# Patient Record
Sex: FEMALE | Race: WHITE | Employment: PART TIME | URBAN - METROPOLITAN AREA
[De-identification: names, ages, dates, MRNs, and addresses within clinical notes are randomized per-mention and may not be internally consistent; named-entity substitution may affect disease eponyms.]

---

## 2017-02-23 ENCOUNTER — OFFICE VISIT (OUTPATIENT)
Dept: FAMILY MEDICINE | Facility: CLINIC | Age: 37
End: 2017-02-23

## 2017-02-23 ENCOUNTER — TELEPHONE (OUTPATIENT)
Dept: FAMILY MEDICINE | Facility: CLINIC | Age: 37
End: 2017-02-23

## 2017-02-23 VITALS
OXYGEN SATURATION: 96 % | RESPIRATION RATE: 18 BRPM | DIASTOLIC BLOOD PRESSURE: 79 MMHG | TEMPERATURE: 98 F | HEART RATE: 73 BPM | SYSTOLIC BLOOD PRESSURE: 116 MMHG

## 2017-02-23 DIAGNOSIS — F33.1 MODERATE EPISODE OF RECURRENT MAJOR DEPRESSIVE DISORDER (H): ICD-10-CM

## 2017-02-23 DIAGNOSIS — M54.9 UPPER BACK PAIN, CHRONIC: Primary | ICD-10-CM

## 2017-02-23 DIAGNOSIS — G89.29 UPPER BACK PAIN, CHRONIC: Primary | ICD-10-CM

## 2017-02-23 RX ORDER — BUPROPION HYDROCHLORIDE 150 MG/1
150 TABLET ORAL EVERY MORNING
Qty: 30 TABLET | Refills: 3 | Status: SHIPPED | OUTPATIENT
Start: 2017-02-23 | End: 2017-02-27

## 2017-02-23 RX ORDER — VENLAFAXINE HYDROCHLORIDE 37.5 MG/1
37.5 CAPSULE, EXTENDED RELEASE ORAL DAILY
Qty: 30 CAPSULE | Refills: 3 | Status: SHIPPED | OUTPATIENT
Start: 2017-02-23 | End: 2017-10-17

## 2017-02-23 ASSESSMENT — ANXIETY QUESTIONNAIRES
GAD7 TOTAL SCORE: 7
2. NOT BEING ABLE TO STOP OR CONTROL WORRYING: SEVERAL DAYS
5. BEING SO RESTLESS THAT IT IS HARD TO SIT STILL: NOT AT ALL
1. FEELING NERVOUS, ANXIOUS, OR ON EDGE: MORE THAN HALF THE DAYS
7. FEELING AFRAID AS IF SOMETHING AWFUL MIGHT HAPPEN: NOT AT ALL
6. BECOMING EASILY ANNOYED OR IRRITABLE: NEARLY EVERY DAY
IF YOU CHECKED OFF ANY PROBLEMS ON THIS QUESTIONNAIRE, HOW DIFFICULT HAVE THESE PROBLEMS MADE IT FOR YOU TO DO YOUR WORK, TAKE CARE OF THINGS AT HOME, OR GET ALONG WITH OTHER PEOPLE: SOMEWHAT DIFFICULT
3. WORRYING TOO MUCH ABOUT DIFFERENT THINGS: SEVERAL DAYS

## 2017-02-23 ASSESSMENT — PATIENT HEALTH QUESTIONNAIRE - PHQ9: 5. POOR APPETITE OR OVEREATING: NOT AT ALL

## 2017-02-23 NOTE — PATIENT INSTRUCTIONS
Buproprion 150mg tablet once daily for at least 2 weeks, then stop.   After 1 month off buproprion completely, start Venlafaxine (Effexor) taper:  Venlafaxine 37.5mg tablet once daily for at least 4 weeks, then stop.     Continue therapy weekly  Follow up with Dr. Cheema in 1-2 months  We can do a slower taper with twice daily dosing if this taper is not working.     Here is the plan from today's visit    1. Upper back pain, chronic  - MASSAGE THERAPY REFERRAL    2. Moderate episode of recurrent major depressive disorder (H)  - buPROPion (WELLBUTRIN XL) 150 MG 24 hr tablet; Take 1 tablet (150 mg) by mouth every morning  Dispense: 30 tablet; Refill: 3  - venlafaxine (EFFEXOR-XR) 37.5 MG 24 hr capsule; Take 1 capsule (37.5 mg) by mouth daily  Dispense: 30 capsule; Refill: 3    Thank you for coming to [facility] today.  Lab Testing:  **If you had lab testing today and your results are reassuring or normal they will be mailed to you or sent through [employer] within 7 days.   **If the lab tests need quick action we will call you with the results.  The phone number we will call with results is # [phone] (home) . If this is not the best number please call our clinic and change the number.  Medication Refills:  If you need any refills please call your pharmacy and they will contact us.   If you need to  your refill at a new pharmacy, please contact the new pharmacy directly. The new pharmacy will help you get your medications transferred faster.   Scheduling:  If you have any concerns about today's visit or wish to schedule another appointment please call our office during normal business hours [phone] (8-5:00 M-F)  If a referral was made to a [facility] and you don't get a call from central scheduling please call [phone].  If a Mammogram was ordered for you at [facility] call [phone] to schedule or change your appointment.  If you had an XRay/CT/Ultrasound/MRI  ordered the number is [phone] to schedule or change your radiology appointment.   Medical Concerns:  If you have urgent medical concerns please call [phone] at any time of the day.

## 2017-02-23 NOTE — TELEPHONE ENCOUNTER
Returned call to pharmacy, wondering if 150 mg bupropion is in addition to current 300 mg script. RN advise that patient was given instructions to taper bupropion, then start venlafaxine after one month off bupropion per AVS instructions. Pharmacist verbalized understanding.    Mary Muniz RN

## 2017-02-23 NOTE — TELEPHONE ENCOUNTER
[facility] phone call message- medication clarification/question:    Full Medication Name: buPROPion (WELLBUTRIN XL) 150 MG 24 hr tablet   /   venlafaxine (EFFEXOR-XR) 37.5 MG 24 hr capsule    Question: Pharmacist has questions regarding the above medication that was sent over today. Please call.     Pharmacy confirmed as   [id number] [address]  [address] [facility] [address]  Phone: [phone] Fax: [phone]    [id number] - [place], [place] - [address] [place] [address]  Phone: [phone] Fax: [phone]  : Yes [facility] at [address] [place] [address]    OK to leave a message on voice mail? Yes    Call taken on February 23, 2017 at 1:20 PM by Stephany Greene

## 2017-02-23 NOTE — MR AVS SNAPSHOT
After Visit Summary   2/23/2017    Mariana Mckeon    MRN: [id number]           Patient Information     Date Of Birth          1980        Visit Information        Provider Department      2/23/2017 11:00 AM Katt Cheema MD [facility]        Today's Diagnoses     Upper back pain, chronic    -  1    Moderate episode of recurrent major depressive disorder (H)          Care Instructions    Buproprion 150mg tablet once daily for at least 2 weeks, then stop.   After 1 month off buproprion completely, start Venlafaxine (Effexor) taper:  Venlafaxine 37.5mg tablet once daily for at least 4 weeks, then stop.     Continue therapy weekly  Follow up with Dr. Cheema in 1-2 months  We can do a slower taper with twice daily dosing if this taper is not working.     Here is the plan from today's visit    1. Upper back pain, chronic  - MASSAGE THERAPY REFERRAL    2. Moderate episode of recurrent major depressive disorder (H)  - buPROPion (WELLBUTRIN XL) 150 MG 24 hr tablet; Take 1 tablet (150 mg) by mouth every morning  Dispense: 30 tablet; Refill: 3  - venlafaxine (EFFEXOR-XR) 37.5 MG 24 hr capsule; Take 1 capsule (37.5 mg) by mouth daily  Dispense: 30 capsule; Refill: 3    Thank you for coming to [facility] today.  Lab Testing:  **If you had lab testing today and your results are reassuring or normal they will be mailed to you or sent through [employer] within 7 days.   **If the lab tests need quick action we will call you with the results.  The phone number we will call with results is # [phone] (home) . If this is not the best number please call our clinic and change the number.  Medication Refills:  If you need any refills please call your pharmacy and they will contact us.   If you need to  your refill at a new pharmacy, please contact the new pharmacy directly. The new pharmacy will help you get your medications transferred faster.   Scheduling:  If you have any  concerns about today's visit or wish to schedule another appointment please call our office during normal business hours [phone] (8-5:00 M-F)  If a referral was made to a [facility] and you don't get a call from central scheduling please call [phone].  If a Mammogram was ordered for you at [facility] call [phone] to schedule or change your appointment.  If you had an XRay/CT/Ultrasound/MRI ordered the number is [phone] to schedule or change your radiology appointment.   Medical Concerns:  If you have urgent medical concerns please call [phone] at any time of the day.            Follow-ups after your visit        Additional Services     MASSAGE THERAPY REFERRAL       Please be aware that coverage of these services is subject to the terms and limitations of your health insurance plan.  Call member services at your health plan with any benefit or coverage questions.      Please bring the following to your appointment:    >>   Any x-rays, CTs or MRIs which have been performed.  Contact the facility where they were done to arrange for  prior to your scheduled appointment.    >>   List of current medications   >>   This referral request   >>   Any documents/labs given to you for this referral                  Who to contact     Please call your clinic at [phone] to:    Ask questions about your health    Make or cancel appointments    Discuss your medicines    Learn about your test results    Speak to your doctor   If you have compliments or concerns about an experience at your clinic, or if you wish to file a complaint, please contact [facility] Patient Relations at [phone] or email us at [email or web address]         Additional Information About Your Visit        SynapSense Information     Syn[employer]Sense is an electronic gateway that provides easy, online access to your medical records. With SynapSense, you can request a  clinic appointment, read your test results, renew a prescription or communicate with your care team.     To sign up for [employer] visit the website at [email or web address]   You will be asked to enter the access code listed below, as well as some personal information. Please follow the directions to create your username and password.     Your access code is: [email or web address]  Expires: 2017 11:44 AM     Your access code will  in 90 days. If you need help or a new code, please contact your [facility] or call [phone] for assistance.        Care EveryWhere ID     This is your Care EveryWhere ID. This could be used by other organizations to access your [facility] medical records  [id number]        Your Vitals Were     Pulse Temperature Respirations Last Period Pulse Oximetry Breastfeeding?    73 98  F (36.7  C) (Oral) 18 02/15/2017 96% No       Blood Pressure from Last 3 Encounters:   17 116/79   10/18/16 120/79   16 120/81    Weight from Last 3 Encounters:   10/18/16 199 lb 3.2 oz (90.4 kg)   16 202 lb 6.4 oz (91.8 kg)   11/12/15 202 lb (91.6 kg)              We Performed the Following     MASSAGE THERAPY REFERRAL          Today's Medication Changes          These changes are accurate as of: 17 11:44 AM.  If you have any questions, ask your nurse or doctor.               These medicines have changed or have updated prescriptions.        Dose/Directions    buPROPion 150 MG 24 hr tablet   Commonly known as:  WELLBUTRIN XL   This may have changed:    - medication strength  - how much to take   Used for:  Moderate episode of recurrent major depressive disorder (H)   Changed by:  Katt Cheema MD        Dose:  150 mg   Take 1 tablet (150 mg) by mouth every morning   Quantity:  30 tablet   Refills:  3       venlafaxine 37.5 MG 24 hr capsule   Commonly known as:  EFFEXOR-XR   This may have changed:    - medication strength  - how much to take   Used  for:  Moderate episode of recurrent major depressive disorder (H)   Changed by:  Katt Cheema MD        Dose:  37.5 mg   Take 1 capsule (37.5 mg) by mouth daily   Quantity:  30 capsule   Refills:  3            Where to get your medicines      These medications were sent to [facility] - [place], [place] - [address] [facility]  [address] [facility] [address]     Phone:  [phone]     buPROPion 150 MG 24 hr tablet    venlafaxine 37.5 MG 24 hr capsule                Primary Care Provider Office Phone # Fax #    Katt Cheema [phone]       [facility] [address] [facility] [address]        Thank you!     Thank you for choosing [facility]  for your care. Our goal is always to provide you with excellent care. Hearing back from our patients is one way we can continue to improve our services. Please take a few minutes to complete the written survey that you may receive in the mail after your visit with us. Thank you!             Your Updated Medication List - Protect others around you: Learn how to safely use, store and throw away your medicines at www.disposemymeds.org.          This list is accurate as of: 2/23/17 11:44 AM.  Always use your most recent med list.                   Brand Name Dispense Instructions for use    buPROPion 150 MG 24 hr tablet    WELLBUTRIN XL    30 tablet    Take 1 tablet (150 mg) by mouth every morning       NO ACTIVE MEDICATIONS      .       venlafaxine 37.5 MG 24 hr capsule    EFFEXOR-XR    30 capsule    Take 1 capsule (37.5 mg) by mouth daily

## 2017-02-23 NOTE — PROGRESS NOTES
"      HPI:       Mariana Mckeon is a 36 year old who presents for the following  Patient presents with:  Recheck Medication    Anxiety meds  Pt doesn't feel like the buproprion is helping much. She twitches while sleeping or in resting mode. Overall no \"bad\" reaction.   Venlafaxine initally started when she ran out of sertraline. Has noticed since she's been on this she's been more jumpy, irritable, and more auditory and sensory issues. Did not have these side effects with the sertraline - took a month to get used to due to nausea.   Feels she would do well off meds because she has a stable job, therapist, regular doctor, feels she has good supports in her life.   Ok with Buspar if off meds doesn't work.   Therapy is once weekly.   PHQ-9 SCORE 2/23/2017   Total Score 14     CHARLI-7 SCORE 2/23/2017   Total Score 7     Massage referral for stress and sleep    Problem, Medication and Allergy Lists were reviewed and are current.  Patient is an established patient of this clinic.         Review of Systems:   Review of Systems          Physical Exam:   Patient Vitals for the past 24 hrs:   BP Temp Temp src Pulse Resp SpO2 Weight   02/23/17 1108 116/79 98  F (36.7  C) Oral 73 18 96 % -     There is no height or weight on file to calculate BMI.  Vitals were reviewed and were normal     Physical Exam   Constitutional: She appears well-developed and well-nourished.   HENT:   Head: Normocephalic and atraumatic.   Eyes: Conjunctivae are normal.   Cardiovascular: Normal rate.    Pulmonary/Chest: Effort normal. No respiratory distress.   Skin: Skin is warm and dry. No rash noted. No erythema. No pallor.   Psychiatric: She has a normal mood and affect. Her behavior is normal. Judgment and thought content normal.   Vitals reviewed.        Results:       Assessment and Plan       Moderate episode of recurrent major depressive disorder (H)  OFF- TAPER:  Buproprion 150mg tablet once daily for at least 2 weeks, then stop.   After 1 month off " buproprion completely, start Venlafaxine (Effexor) taper:  Venlafaxine 37.5mg tablet once daily for at least 4 weeks, then stop.   Continue therapy weekly  Follow up with Dr. Cheema in 1-2 months  We can do a slower taper with twice daily dosing (and 25mg immediate release pills) if this taper is not working.   Meds prescribed:  - buPROPion (WELLBUTRIN XL) 150 MG 24 hr tablet; Take 1 tablet (150 mg) by mouth every morning  Dispense: 30 tablet; Refill: 3  - venlafaxine (EFFEXOR-XR) 37.5 MG 24 hr capsule; Take 1 capsule (37.5 mg) by mouth daily  Dispense: 30 capsule; Refill: 3    Upper back pain, chronic  - MASSAGE THERAPY REFERRAL    There are no discontinued medications.  Options for treatment and follow-up care were reviewed with the patient. Mariana Mckeon  engaged in the decision making process and verbalized understanding of the options discussed and agreed with the final plan.    I spent 25 min face to face with the patient and >50% was spent counselling the patient about the above medical conditions, educating patient and discussing the recommendations and followup .    Katt Cheema MD  [facility]

## 2017-02-24 ASSESSMENT — ANXIETY QUESTIONNAIRES: GAD7 TOTAL SCORE: 7

## 2017-02-24 ASSESSMENT — PATIENT HEALTH QUESTIONNAIRE - PHQ9: SUM OF ALL RESPONSES TO PHQ QUESTIONS 1-9: 14

## 2017-02-27 DIAGNOSIS — F33.1 MODERATE EPISODE OF RECURRENT MAJOR DEPRESSIVE DISORDER (H): ICD-10-CM

## 2017-02-27 NOTE — TELEPHONE ENCOUNTER
Request for medication refill:    Date of last visit at clinic: 02/23/2017    Please complete refill if appropriate and CLOSE ENCOUNTER.    Closing the encounter signifies the refill is complete.    If refill has been denied, please complete the smart phrase .smirefuse and route it to the [facility] RN TRIAGE pool to inform the patient and the pharmacy.    Darcie Castle, CMA

## 2017-03-01 RX ORDER — BUPROPION HYDROCHLORIDE 150 MG/1
150 TABLET ORAL EVERY MORNING
Qty: 30 TABLET | Refills: 0 | Status: SHIPPED | OUTPATIENT
Start: 2017-03-01 | End: 2017-10-17

## 2017-09-19 ENCOUNTER — OFFICE VISIT (OUTPATIENT)
Dept: FAMILY MEDICINE | Facility: CLINIC | Age: 37
End: 2017-09-19

## 2017-09-19 VITALS
RESPIRATION RATE: 18 BRPM | WEIGHT: 186.2 LBS | TEMPERATURE: 97.5 F | SYSTOLIC BLOOD PRESSURE: 117 MMHG | HEART RATE: 60 BPM | OXYGEN SATURATION: 99 % | DIASTOLIC BLOOD PRESSURE: 78 MMHG | BODY MASS INDEX: 31.02 KG/M2 | HEIGHT: 65 IN

## 2017-09-19 DIAGNOSIS — R87.810 ASCUS WITH POSITIVE HIGH RISK HPV CERVICAL: ICD-10-CM

## 2017-09-19 DIAGNOSIS — R87.610 ASCUS WITH POSITIVE HIGH RISK HPV CERVICAL: ICD-10-CM

## 2017-09-19 DIAGNOSIS — Z00.00 PREVENTATIVE HEALTH CARE: ICD-10-CM

## 2017-09-19 DIAGNOSIS — Z11.3 ROUTINE SCREENING FOR STI (SEXUALLY TRANSMITTED INFECTION): Primary | ICD-10-CM

## 2017-09-19 DIAGNOSIS — Z87.42 HISTORY OF ABNORMAL CERVICAL PAP SMEAR: ICD-10-CM

## 2017-09-19 DIAGNOSIS — B37.31 CANDIDIASIS OF VAGINA: ICD-10-CM

## 2017-09-19 LAB
BACTERIA: NORMAL
CLUE CELLS: NORMAL
MOTILE TRICHOMONAS: NEGATIVE
ODOR: NORMAL
PH WET PREP: <4.5
WBC WET PREP: NORMAL
YEAST: PRESENT

## 2017-09-19 NOTE — MR AVS SNAPSHOT
After Visit Summary   2017    Mariana Mckeon    MRN: [id number]           Patient Information     Date Of Birth          1980        Visit Information        Provider Department      2017 4:20 PM Derick Gambino MD [facility]        Today's Diagnoses     Routine screening for STI (sexually transmitted infection)    -  1    Preventative health care        History of abnormal cervical Pap smear        Candidiasis of vagina        ASCUS with positive high risk HPV cervical           Follow-ups after your visit        Who to contact     Please call your clinic at [phone] to:    Ask questions about your health    Make or cancel appointments    Discuss your medicines    Learn about your test results    Speak to your doctor   If you have compliments or concerns about an experience at your clinic, or if you wish to file a complaint, please contact [facility] Patient Relations at [phone] or email us at [email or web address]         Additional Information About Your Visit        MyChart Information     HouseCallt is an electronic gateway that provides easy, online access to your medical records. With [employer], you can request a clinic appointment, read your test results, renew a prescription or communicate with your care team.     To sign up for [employer] visit the website at [email or web address]   You will be asked to enter the access code listed below, as well as some personal information. Please follow the directions to create your username and password.     Your access code is: [email or web address]  Expires: 2017  1:49 PM     Your access code will  in 90 days. If you need help or a new code, please contact your [facility] or call [phone] for assistance.        Care EveryWhere ID     This is your Care EveryWhere ID. This could be used by other organizations to access your [facility]  "records  [id number]        Your Vitals Were     Pulse Temperature Respirations Height Pulse Oximetry BMI (Body Mass Index)    60 97.5  F (36.4  C) (Oral) 18 5' 5\" (165.1 cm) 99% 30.99 kg/m2       Blood Pressure from Last 3 Encounters:   09/19/17 117/78   02/23/17 116/79   10/18/16 120/79    Weight from Last 3 Encounters:   09/19/17 186 lb 3.2 oz (84.5 kg)   10/18/16 199 lb 3.2 oz (90.4 kg)   09/12/16 202 lb 6.4 oz (91.8 kg)              We Performed the Following     Anti Treponema     Chlamydia trachomatis PCR     HIV Antigen Antibody Combo     HPV High Risk Types DNA Cervical     Neisseria gonorrhoeae PCR     Pap imaged thin layer screen with HPV - recommended age 30 - 65 years (select HPV order below)     Wet Prep ([employer])          Today's Medication Changes          These changes are accurate as of: 9/19/17 11:59 PM.  If you have any questions, ask your nurse or doctor.               Start taking these medicines.        Dose/Directions    fluconazole 150 MG tablet   Commonly known as:  DIFLUCAN   Used for:  Candidiasis of vagina   Started by:  Derick Gambino MD        Dose:  150 mg   Take 1 tablet (150 mg) by mouth once for 1 dose   Quantity:  1 tablet   Refills:  0            Where to get your medicines      These medications were sent to [facility] [address] [address] [place] [id number]     Phone:  [phone]     fluconazole 150 MG tablet                Primary Care Provider Office Phone # Fax #    Katt Tyson Cheema [phone]       [address] [facility] [address]        Equal Access to Services     CAM MOURA AH: Hadabdulaziz De Guzman, waaxda luqadaha, qaybta kaalmasophie elizabeth. So [facility] [phone].    ATENCIÓN: Si habla español, tiene a andre disposición servicios gratuitos de asistencia lingüística. Llame al [phone].    We comply with applicable federal civil rights laws " and [place] laws. We do not discriminate on the basis of race, color, national origin, age, disability sex, sexual orientation or gender identity.            Thank you!     Thank you for choosing [facility]  for your care. Our goal is always to provide you with excellent care. Hearing back from our patients is one way we can continue to improve our services. Please take a few minutes to complete the written survey that you may receive in the mail after your visit with us. Thank you!             Your Updated Medication List - Protect others around you: Learn how to safely use, store and throw away your medicines at www.disposemymeds.org.          This list is accurate as of: 9/19/17 11:59 PM.  Always use your most recent med list.                   Brand Name Dispense Instructions for use Diagnosis    buPROPion 150 MG 24 hr tablet    WELLBUTRIN XL    30 tablet    Take 1 tablet (150 mg) by mouth every morning    Moderate episode of recurrent major depressive disorder (H)       fluconazole 150 MG tablet    DIFLUCAN    1 tablet    Take 1 tablet (150 mg) by mouth once for 1 dose    Candidiasis of vagina       NO ACTIVE MEDICATIONS      .        venlafaxine 37.5 MG 24 hr capsule    EFFEXOR-XR    30 capsule    Take 1 capsule (37.5 mg) by mouth daily    Moderate episode of recurrent major depressive disorder (H)

## 2017-09-19 NOTE — PROGRESS NOTES
Preceptor Attestation:   Patient seen and discussed with the resident. Assessment and plan reviewed with resident and agreed upon.   Supervising Physician:  Michelle Aponte MD  [facility]

## 2017-09-19 NOTE — PROGRESS NOTES
"      HPI:       Mariana Mckeon is a 37 year old who presents for a sexual health visit to be checked for STIs.     Mariana denies current symptoms of vaginal discharge, itching or pelvic pain. She was last sexually active in 2017. Condom use at the time was inconsistent. Has not used other forms of contraception. Has been sexually active with 2 partners in the last year, both with inconsistent condom use. Has not had multiple sexual partners at the same time. Is currently with a new partner, yet to become sexually active with this partner but would like to ensure she is \"clean\" prior to sexual intercourse.     Planning on condoms for contraception.    LMP around the beginning of September.     STI history: believes she has a history of oral and genital herpes but has not been officially tested. Has not had an outbreak in over 1 year.     Obstetric history:      Additional concern: due for pap soon in October, has a history of ASCUS in . Would like to have pap done today along with STI testing.     Problem, Medication and Allergy Lists were reviewed and are current.  Patient is an established patient of this clinic.         Review of Systems:   Review of Systems   Genitourinary: Negative for dysuria, frequency, hematuria, menstrual problem, pelvic pain, vaginal discharge and vaginal pain.             Physical Exam:   Patient Vitals for the past 24 hrs:   BP Temp Temp src Pulse Resp SpO2 Height Weight   17 1626 117/78 97.5  F (36.4  C) Oral 60 18 99 % 5' 5\" (165.1 cm) 186 lb 3.2 oz (84.5 kg)     Body mass index is 30.99 kg/(m^2).  Vitals were reviewed and were normal     Physical Exam   Constitutional: No distress.   Genitourinary: Vagina normal. There is no lesion on the right labia. There is no lesion on the left labia. Cervix exhibits no discharge. No vaginal discharge found.   Skin: She is not diaphoretic.   Psychiatric: She has a normal mood and affect. Her behavior is normal. Judgment and " thought content normal.       Results:     Results for orders placed or performed in visit on 09/19/17   Wet Prep (LabDAQ)   Result Value Ref Range    Yeast Wet Prep Present none    Motile Trichomonas Wet Prep Negative Negative    Clue Cells Wet Prep None NONE    WBC WET PREP 5-10 2 - 5    Bacteria Wet Prep Many None    pH Wet Prep <4.5 3.8 - 4.5    Odor Wet Prep None NONE   Anti Treponema   Result Value Ref Range    Treponema pallidum Antibody Negative NEG^Negative   HIV Antigen Antibody Combo   Result Value Ref Range    HIV Antigen Antibody Combo Nonreactive NR^Nonreactive       Pap imaged thin layer screen with HPV - recommended age 30 - 65 years (select HPV order below)   Result Value Ref Range    PAP NIL     Copath Report         Acc#: [id number]   Signed: 9/22/2017 14:53   MR#: [id number]    SPECIMEN/STAIN PROCESS:  Pap imaged thin layer prep screening (Surepath, FocalPoint with guided  screening)       Pap-Cyto x 1, HPV ordered x 1    SOURCE: Cervical, endocervical  ----------------------------------------------------------------   Pap imaged thin layer prep screening (Surepath, FocalPoint with guided  screening)  SPECIMEN ADEQUACY:  Satisfactory for evaluation.  -Transformation zone component absent.    CYTOLOGIC INTERPRETATION:    Negative for intraepithelial lesion or malignancy    Electronically signed by:  EPI Drew (ASCP)    CLINICAL HISTORY:    Previous normal pap  Date of Last Pap: 10/18/2016,    Papanicolaou Test Limitations:  Cervical cytology is a screening test  with limited sensitivity; regular screening is critical for cancer  prevention; Pap tests are primarily effective for the  diagnosis/prevention of squamous cell carcinoma, not adenocarcinomas or  other cancers.  TESTING LAB LOCATION :  [facility] [address] [address], [phone]  Processed and screened at [facility],  [place],  [facility]     Neisseria gonorrhoeae PCR   Result Value Ref Range    Specimen Descrip Cervical     N Gonorrhea PCR Negative NEG^Negative   Chlamydia trachomatis PCR   Result Value Ref Range    Specimen Description Cervical     Chlamydia Trachomatis PCR Negative NEG^Negative       Assessment and Plan     1. Routine screening for STI (sexually transmitted infection)  STI testing done. Wet prep positive for yeast; Fluconazole prescribed.   - Neisseria gonorrhoeae PCR  - Chlamydia trachomatis PCR  - Wet Prep (LabDAQ)  - Anti Treponema  - HIV Antigen Antibody Combo  - HPV High Risk Types DNA Cervical    2. Preventative health care  3. History of abnormal cervical Pap smear  Patient has a history of ASCUS on pap done 11/12/2015, also tested positive for HR HPV. Follow up colposcopy done 10/18/2016 with ECC showing no evidence of dysplasia or malignancy. Pap smear on 10/18/2019 was NIL and negative for HR HPV.   Pap smear done at this clinic visit and is NIL however transformation zone is absent. HPV testing pending. Per ASCCP guidelines, management will depend on result of HPV testing. Will await results and notify patient of plan for follow up.   - Pap imaged thin layer screen with HPV - recommended age 30 - 65 years (select HPV order below)    4. Candidiasis of vagina  - fluconazole (DIFLUCAN) 150 MG tablet; Take 1 tablet (150 mg) by mouth once for 1 dose  Dispense: 1 tablet; Refill: 0    There are no discontinued medications.  Options for treatment and follow-up care were reviewed with the patient. Mariana Mckeon  engaged in the decision making process and verbalized understanding of the options discussed and agreed with the final plan.    Derick Gambino MD  Family Medicine PGY2

## 2017-09-19 NOTE — LETTER
October 5, 2017      Mariana Mckeon  [address]  [place] [address]        Dear Mariana,    Thank you for getting your care at [facility]. Please see below for your pap smear results. Your pap smear was normal and you tested negative for any high risk HPV viruses. With your history of abnormal pap smear in 2015 followed by the normal pap smears and HPV testing, we can now repeat the pap smear in 3 years instead of annually. So, your next Pap smear with HPV testing will be September 2020.     Resulted Orders   Neisseria gonorrhoeae PCR   Result Value Ref Range    Specimen Descrip Cervical     N Gonorrhea PCR Negative NEG^Negative      Comment:      Negative for N. gonorrhoeae rRNA by transcription mediated amplification.  A negative result by transcription mediated amplification does not preclude   the presence of N. gonorrhoeae infection because results are dependent on   proper and adequate collection, absence of inhibitors, and sufficient rRNA to   be detected.     Chlamydia trachomatis PCR   Result Value Ref Range    Specimen Description Cervical     Chlamydia Trachomatis PCR Negative NEG^Negative      Comment:      Negative for C. trachomatis rRNA by transcription mediated amplification.  A negative result by transcription mediated amplification does not preclude   the presence of C. trachomatis infection because results are dependent on   proper and adequate collection, absence of inhibitors, and sufficient rRNA to   be detected.     Wet Prep (LabDAQ)   Result Value Ref Range    Yeast Wet Prep Present none    Motile Trichomonas Wet Prep Negative Negative    Clue Cells Wet Prep None NONE    WBC WET PREP 5-10 2 - 5    Bacteria Wet Prep Many None    pH Wet Prep <4.5 3.8 - 4.5    Odor Wet Prep None NONE   Anti Treponema   Result Value Ref Range    Treponema pallidum Antibody Negative NEG^Negative   HIV Antigen Antibody Combo   Result Value Ref Range    HIV Antigen Antibody Combo Nonreactive NR^Nonreactive           Comment:      HIV-1 p24 Ag & HIV-1/HIV-2 Ab Not Detected   Pap imaged thin layer screen with HPV - recommended age 30 - 65 years (select HPV order below)   Result Value Ref Range    PAP NIL     [place] Report         Acc#: [id number]   Signed: 9/22/2017 14:53   MR#: [id number]    SPECIMEN/STAIN PROCESS:  Pap imaged thin layer prep screening (Surepath, FocalPoint with guided  screening)       Pap-Cyto x 1, HPV ordered x 1    SOURCE: Cervical, endocervical  ----------------------------------------------------------------   Pap imaged thin layer prep screening (Surepath, FocalPoint with guided  screening)  SPECIMEN ADEQUACY:  Satisfactory for evaluation.  -Transformation zone component absent.    CYTOLOGIC INTERPRETATION:    Negative for intraepithelial lesion or malignancy    Electronically signed by:  EPI Drew (ASCP)    CLINICAL HISTORY:    Previous normal pap  Date of Last Pap: 10/18/2016,    Papanicolaou Test Limitations:  Cervical cytology is a screening test  with limited sensitivity; regular screening is critical for cancer  prevention; Pap tests are primarily effective for the  diagnosis/prevention of squamous cell carcinoma, not adenocarcinomas or  other cancers.  TESTING LAB LOCATION :  [facility] [address] [address], [phone]  Processed and screened at [facility],  [facility]     HPV High Risk Types DNA Cervical   Result Value Ref Range    HPV 16 DNA Negative NEG^Negative    HPV 18 DNA Negative NEG^Negative    Other HR HPV Negative NEG^Negative    Final Diagnosis This patient's sample is negative for HPV DNA.       Comment:      (Note)  METHODOLOGY:  The Roche annel 4800 system uses automated extraction,   simultaneous amplification of HPV (L1 region) and beta-globin,    followed by  real time detection of fluorescent labeled HPV and beta   globin using specific oligonucleotide  probes . The test specifically   identifies types HPV 16 DNA and HPV 18 DNA while concurrently   detecting the rest of the high risk types (31, 33, 35, 39, 45, 51,   52, 56, 58, 59, 66 or 68).  COMMENTS:  This test is not intended for use as a screening device   for women under age 30 with normal cervical cytology.  Results should   be correlated with cytologic and histologic findings. Close clinical   followup is recommended.  This test was developed and its performance characteristics   determined by the [facility], [facility]   [facility]. It has not been cleared or approved by the   FDA. The laboratory is regulated under CLIA as qualified to perform   high-complexity testing. This test is used   for clinical purposes. It   should not be regarded as investigational or for research.      Specimen Description Cervical Cells       Comment:      [id number]       If you have any further questions regarding these results, please call the clinic at [phone] and leave a message for me.      Sincerely,      Zonia Gambino MD

## 2017-09-21 ENCOUNTER — TELEPHONE (OUTPATIENT)
Dept: FAMILY MEDICINE | Facility: CLINIC | Age: 37
End: 2017-09-21

## 2017-09-21 LAB
C TRACH DNA SPEC QL NAA+PROBE: NEGATIVE
HIV 1+2 AB+HIV1 P24 AG SERPL QL IA: NONREACTIVE
N GONORRHOEA DNA SPEC QL NAA+PROBE: NEGATIVE
SPECIMEN SOURCE: NORMAL
SPECIMEN SOURCE: NORMAL
T PALLIDUM IGG+IGM SER QL: NEGATIVE

## 2017-09-21 RX ORDER — FLUCONAZOLE 150 MG/1
150 TABLET ORAL ONCE
Qty: 1 TABLET | Refills: 0 | Status: SHIPPED | OUTPATIENT
Start: 2017-09-21 | End: 2017-10-17

## 2017-09-21 NOTE — TELEPHONE ENCOUNTER
[facility] phone call message- patient requesting results:    Test: Lab    Date of test: 09/19/2017    Additional Comments: Please call the patient back she is returning your call from today    OK to leave a message on voice mail? Yes    Primary language: English      needed? No    Call taken on September 21, 2017 at 10:06 AM by Gaye Henderson

## 2017-09-22 LAB
COPATH REPORT: NORMAL
PAP: NORMAL

## 2017-09-25 ASSESSMENT — ENCOUNTER SYMPTOMS
DYSURIA: 0
HEMATURIA: 0
FREQUENCY: 0

## 2017-09-26 LAB
FINAL DIAGNOSIS: NORMAL
HPV HR 12 DNA CVX QL NAA+PROBE: NEGATIVE
HPV16 DNA SPEC QL NAA+PROBE: NEGATIVE
HPV18 DNA SPEC QL NAA+PROBE: NEGATIVE
SPECIMEN DESCRIPTION: NORMAL

## 2017-10-17 ENCOUNTER — CARE COORDINATION (OUTPATIENT)
Dept: PSYCHOLOGY | Facility: CLINIC | Age: 37
End: 2017-10-17

## 2017-10-17 ENCOUNTER — OFFICE VISIT (OUTPATIENT)
Dept: FAMILY MEDICINE | Facility: CLINIC | Age: 37
End: 2017-10-17

## 2017-10-17 VITALS
WEIGHT: 184 LBS | SYSTOLIC BLOOD PRESSURE: 119 MMHG | HEART RATE: 77 BPM | BODY MASS INDEX: 30.62 KG/M2 | RESPIRATION RATE: 16 BRPM | DIASTOLIC BLOOD PRESSURE: 72 MMHG | TEMPERATURE: 97.5 F | OXYGEN SATURATION: 98 %

## 2017-10-17 DIAGNOSIS — F33.1 MODERATE EPISODE OF RECURRENT MAJOR DEPRESSIVE DISORDER (H): Primary | ICD-10-CM

## 2017-10-17 DIAGNOSIS — B37.31 CANDIDIASIS OF VAGINA: ICD-10-CM

## 2017-10-17 RX ORDER — FLUCONAZOLE 150 MG/1
150 TABLET ORAL ONCE
Qty: 1 TABLET | Refills: 0 | Status: SHIPPED | OUTPATIENT
Start: 2017-10-17 | End: 2017-10-17

## 2017-10-17 RX ORDER — VENLAFAXINE HYDROCHLORIDE 37.5 MG/1
CAPSULE, EXTENDED RELEASE ORAL
Qty: 46 CAPSULE | Refills: 0 | Status: SHIPPED | OUTPATIENT
Start: 2017-10-17 | End: 2017-11-14

## 2017-10-17 ASSESSMENT — ENCOUNTER SYMPTOMS
DIARRHEA: 0
NERVOUS/ANXIOUS: 1
VOMITING: 0
DIZZINESS: 0
COUGH: 0
DYSPHORIC MOOD: 1
FATIGUE: 1
HEADACHES: 0
CHILLS: 0
CONSTIPATION: 0
DECREASED CONCENTRATION: 1
PALPITATIONS: 1
SHORTNESS OF BREATH: 0
SLEEP DISTURBANCE: 1
ACTIVITY CHANGE: 1
FEVER: 0
ABDOMINAL PAIN: 0
NAUSEA: 1

## 2017-10-17 ASSESSMENT — ANXIETY QUESTIONNAIRES
2. NOT BEING ABLE TO STOP OR CONTROL WORRYING: NEARLY EVERY DAY
1. FEELING NERVOUS, ANXIOUS, OR ON EDGE: NEARLY EVERY DAY
GAD7 TOTAL SCORE: 14
3. WORRYING TOO MUCH ABOUT DIFFERENT THINGS: NEARLY EVERY DAY
IF YOU CHECKED OFF ANY PROBLEMS ON THIS QUESTIONNAIRE, HOW DIFFICULT HAVE THESE PROBLEMS MADE IT FOR YOU TO DO YOUR WORK, TAKE CARE OF THINGS AT HOME, OR GET ALONG WITH OTHER PEOPLE: SOMEWHAT DIFFICULT
5. BEING SO RESTLESS THAT IT IS HARD TO SIT STILL: NOT AT ALL
7. FEELING AFRAID AS IF SOMETHING AWFUL MIGHT HAPPEN: MORE THAN HALF THE DAYS
6. BECOMING EASILY ANNOYED OR IRRITABLE: MORE THAN HALF THE DAYS

## 2017-10-17 ASSESSMENT — PATIENT HEALTH QUESTIONNAIRE - PHQ9
5. POOR APPETITE OR OVEREATING: SEVERAL DAYS
SUM OF ALL RESPONSES TO PHQ QUESTIONS 1-9: 21

## 2017-10-17 NOTE — LETTER
Dear Leslee Roca,   My name is Bibi Lopez and I am reaching out to introduce myself as the new [facility] [place] ([facility])  from [facility]. Mariana Mckeon has begun the process of enrollment in [facility], pending her updated Diagnostic Assessment. As her  I am here to help her manage her health care by providing assistance with any presenting challenges in her life. In order for a patient to qualify they must be on MA, PMAP or Medicare and Medicaid, and be diagnosed with serious and persistent mental illness, serious mental illness or serious emotional disturbances. In order to stay enrolled in [facility] I must have monthly contact by phone with the patient, family members or patient s providers and also I am required to meet with this patient every two months in person. I am here to provide care coordination and ongoing resources. It would be great if we could have ongoing contact in order to collaborate and offer [place] the best support.      Please do not hesitate to call me with any questions.     I look forward to working with you.     Thank you,     Bibi Lopez, Care Coordinator  [phone]

## 2017-10-17 NOTE — MR AVS SNAPSHOT
After Visit Summary   10/17/2017    Mariana Mckeon    MRN: [id number]           Patient Information     Date Of Birth          1980        Visit Information        Provider Department      10/17/2017 8:40 AM Bakari Velasquez MD [facility]        Today's Diagnoses     Moderate episode of recurrent major depressive disorder (H)    -  1    Candidiasis of vagina          Care Instructions    Here is the plan from today's visit    1. Moderate episode of recurrent major depressive disorder (H) - Encourage exercise, healthy diet, and complete smoking cessation.  Will call for follow up via phone in 2 weeks and a visit in 4 weeks.  - venlafaxine (EFFEXOR-XR) 37.5 MG 24 hr capsule; Take 1 capsule daily for 14 days, then take 2 capsules daily.  Dispense: 46 capsule; Refill: 0    2. Candidiasis of vagina  - fluconazole (DIFLUCAN) 150 MG tablet; Take 1 tablet (150 mg) by mouth once for 1 dose  Dispense: 1 tablet; Refill: 0      Please call or return to clinic if your symptoms don't go away.    Follow up plan  Please make a clinic appointment for follow up with me (BAKARI VELASQUEZ) in 4  weeks for follow up.    Thank you for coming to [facility] today.  Lab Testing:  **If you had lab testing today and your results are reassuring or normal they will be mailed to you or sent through HotDesk within 7 days.   **If the lab tests need quick action we will call you with the results.  The phone number we will call with results is # [phone] (home) . If this is not the best number please call our clinic and change the number.  Medication Refills:  If you need any refills please call your pharmacy and they will contact us.   If you need to  your refill at a new pharmacy, please contact the new pharmacy directly. The new pharmacy will help you get your medications transferred faster.   Scheduling:  If you have any concerns about today's visit or wish to schedule another appointment please  call our office during normal business hours [phone] (8-5:00 M-F)  If a referral was made to a [facility] and you don't get a call from central scheduling please call [phone].  If a Mammogram was ordered for you at [facility] call [phone] to schedule or change your appointment.  If you had an XRay/CT/Ultrasound/MRI ordered the number is [phone] to schedule or change your radiology appointment.   Medical Concerns:  If you have urgent medical concerns please call [phone] at any time of the day.          Follow-ups after your visit        Your next 10 appointments already scheduled     Oct 17, 2017  9:30 AM T   [facility] Care Coordinator with [facility] Care Coordinator   [facility] ([facility])    [facility] [address] [place] [address],  [address]  [place] [address]   [phone]              Who to contact     Please call your clinic at [phone] to:    Ask questions about your health    Make or cancel appointments    Discuss your medicines    Learn about your test results    Speak to your doctor   If you have compliments or concerns about an experience at your clinic, or if you wish to file a complaint, please contact [facility] Patient Relations at [phone] or email us at [email or web address]         Additional Information About Your Visit        Regenhart Information     MELA Sciencest is an electronic gateway that provides easy, online access to your medical records. With CHROMAom, you can request a clinic appointment, read your test results, renew a prescription or communicate with your care team.     To sign up for [employer]est visit the website at [email or web address]   You will be asked to enter the access code listed below, as well as some personal information. Please follow the directions to create your username and password.     Your access code is: [email or web address]  Expires: 12/24/2017  1:49 PM     Your  access code will  in 90 days. If you need help or a new code, please contact your [facility] or call [phone] for assistance.        Care EveryWhere ID     This is your Care EveryWhere ID. This could be used by other organizations to access your [facility] medical records  [id number]        Your Vitals Were     Pulse Temperature Respirations Last Period Pulse Oximetry BMI (Body Mass Index)    77 97.5  F (36.4  C) (Oral) 16 10/01/2017 (Approximate) 98% 30.62 kg/m2       Blood Pressure from Last 3 Encounters:   10/17/17 119/72   17 117/78   17 116/79    Weight from Last 3 Encounters:   10/17/17 184 lb (83.5 kg)   17 186 lb 3.2 oz (84.5 kg)   10/18/16 199 lb 3.2 oz (90.4 kg)              Today, you had the following     No orders found for display         Today's Medication Changes          These changes are accurate as of: 10/17/17  9:10 AM.  If you have any questions, ask your nurse or doctor.               Start taking these medicines.        Dose/Directions    fluconazole 150 MG tablet   Commonly known as:  DIFLUCAN   Used for:  Candidiasis of vagina   Started by:  Zara Velaqsuez MD        Dose:  150 mg   Take 1 tablet (150 mg) by mouth once for 1 dose   Quantity:  1 tablet   Refills:  0       venlafaxine 37.5 MG 24 hr capsule   Commonly known as:  EFFEXOR-XR   Used for:  Moderate episode of recurrent major depressive disorder (H)   Started by:  Zara Velasquez MD        Take 1 capsule daily for 14 days, then take 2 capsules daily.   Quantity:  46 capsule   Refills:  0            Where to get your medicines      These medications were sent to [facility] [id number] [place], [place] - [address] [address]     Phone:  [phone]     fluconazole 150 MG tablet    venlafaxine 37.5 MG 24 hr capsule                Primary Care Provider Office Phone # Fax #    Katt Cheema [phone]       2020  [facility] [address]        Equal Access to Services     CAM MOURA : Hadii aad ku hadjasonludin Katerinaali, waarronda ethanabhijitha, seven ammypitasophie elizabeth. So [facility] [phone].    ATENCIÓN: Si habla español, tiene a andre disposición servicios gratuitos de asistencia lingüística. Jose Alejandroame al [phone].    We comply with applicable federal civil rights laws and [place] laws. We do not discriminate on the basis of race, color, national origin, age, disability, sex, sexual orientation, or gender identity.            Thank you!     [facility] you [facility]  for your care. Our goal is always to provide you with excellent care. Hearing back from our patients is one way we can continue to improve our services. Please take a few minutes to complete the written survey that you may receive in the mail after your visit with us. Thank you!             Your Updated Medication List - Protect others around you: Learn how to safely use, store and throw away your medicines at www.disposemymeds.org.          This list is accurate as of: 10/17/17  9:10 AM.  Always use your most recent med list.                   Brand Name Dispense Instructions for use Diagnosis    fluconazole 150 MG tablet    DIFLUCAN    1 tablet    Take 1 tablet (150 mg) by mouth once for 1 dose    Candidiasis of vagina       NO ACTIVE MEDICATIONS      .        venlafaxine 37.5 MG 24 hr capsule    EFFEXOR-XR    46 capsule    Take 1 capsule daily for 14 days, then take 2 capsules daily.    Moderate episode of recurrent major depressive disorder (H)

## 2017-10-17 NOTE — Clinical Note
Please review your psych PE - did you mean she did or did not exhibit depressed mood? You say both. Jorge Lx  KP

## 2017-10-17 NOTE — PROGRESS NOTES
Preceptor Attestation:   Patient seen and discussed with the resident. Assessment and plan reviewed with resident and agreed upon.   Supervising Physician:  Shilpa Damon MD  [facility]

## 2017-10-17 NOTE — PROGRESS NOTES
[facility]  No Data Recorded      Social Work Care Navigator Note      Patient: Mariana Mckeon  Date: October 17, 2017  Preferred Name: Mariana    Previous PHQ-9:   PHQ-9 SCORE 2/23/2017   Total Score 14     Previous CHARLI-7:   CHARLI-7 SCORE 2/23/2017   Total Score 7     ARIELLE LEVEL:  No flowsheet data found.    Preferred Contact:  No Data Recorded    Type of Contact Today: Face to Face in Clinic      Data: (subjective / Objective):    [facility] Introduction:  Hi my name is Bibi Lopez from your [facility] primary care clinic.     I work closely with your primary care provider, Katt Cheema.     If it's ok I'd like to talk about some new services available to you, at no out of pocket cost to you.      Before we get started can you verify your insurance for me? [employer]    What social work or case management services do you receive? (If so, are you receiving ACT or TCM?).  no    Getting to Know You - Whole Person Care:  This new service is called [facility], which is designed to support you as a whole person beyond just your medical needs.      Tell me about what types of things that are causing you stress OR impacting your quality of life?  I don't know a day in my life where I was ever happy, even being on medication.  I am not suicidal, but if I never woke up again, that would be fine by me.      I'm here to be a central point of contact for your healthcare needs and to help with:    Housing    Transportation    Financial resources    Comprehensive Health needs (appointment help, medication costs, etc.)Mental Health    Employment; looking for other work, not happy and not paid enough.      Education    Health Insurance applications    And connecting with social supports or community resources    Out of the things I mentioned what would you find helpful?  Managing my depression, I've been off medication and that is why I'm here today.      To get started:   If patient has a Diagnostic  "Assessment - and sees Leslee Roca as her therapist.  ASHWIN was signed for DA and progress notes and to verbally speak with therapist.  ASHWIN signed to speak with patient's mother, she stated, \"If anything should happen to me.\"          When you come into the clinic there will be a few forms for you to fill out in the lobby.    We'll work together on a brief assessment to better understand how we can help and then put together a plan to meet your needs.      Patient response to [facility] Service offering:   Considering enrolling in [facility] services    Bibi Lopez, Social Work Care Coordinator   ________________________________________________________________  Administrative   - Social Work Staff Info:     Update the [facility] Brief Needs Assessment Flowsheet \"enrollment status\"     \"declined\"    \"considering\" enrolling in [facility] services.      \"Interested and will enroll\"    \"waitlisted\"    Update enrollment status to \"enrolled\" only when they have come into clinic and completed the paper consent and brief needs assessment.    Verify that patient has had Diagnostic Assessment within the past 12 months and if not schedule an appointment with the [facility] to complete.                "

## 2017-10-17 NOTE — PATIENT INSTRUCTIONS
Here is the plan from today's visit    1. Moderate episode of recurrent major depressive disorder (H) - Encourage exercise, healthy diet, and complete smoking cessation.  Will call for follow up via phone in 2 weeks and a visit in 4 weeks.  - venlafaxine (EFFEXOR-XR) 37.5 MG 24 hr capsule; Take 1 capsule daily for 14 days, then take 2 capsules daily.  Dispense: 46 capsule; Refill: 0    2. Candidiasis of vagina  - fluconazole (DIFLUCAN) 150 MG tablet; Take 1 tablet (150 mg) by mouth once for 1 dose  Dispense: 1 tablet; Refill: 0      Please call or return to clinic if your symptoms don't go away.    Follow up plan  Please make a clinic appointment for follow up with me (BAKARI GARCIA) in 4  weeks for follow up.    Thank you for coming to [facility] today.  Lab Testing:  **If you had lab testing today and your results are reassuring or normal they will be mailed to you or sent through [employer] within 7 days.   **If the lab tests need quick action we will call you with the results.  The phone number we will call with results is # [phone] (home) . If this is not the best number please call our clinic and change the number.  Medication Refills:  If you need any refills please call your pharmacy and they will contact us.   If you need to  your refill at a new pharmacy, please contact the new pharmacy directly. The new pharmacy will help you get your medications transferred faster.   Scheduling:  If you have any concerns about today's visit or wish to schedule another appointment please call our office during normal business hours [phone] (8-5:00 M-F)  If a referral was made to a [facility] and you don't get a call from central scheduling please call [phone].  If a Mammogram was ordered for you at [facility] call [phone] to schedule or change your appointment.  If you had an XRay/CT/Ultrasound/MRI ordered the number is [phone] to schedule or change your  radiology appointment.   Medical Concerns:  If you have urgent medical concerns please call [phone] at any time of the day.

## 2017-10-17 NOTE — PROGRESS NOTES
HPI:       Mariana Mkceon is a 37 year old who presents for the following  Patient presents with:  Refill Request: discuss depression meds and represcribed yeast infection meds.      Depression/Anxiety follow up       Status since last visit: Depression worsened since tapered off medications in February (completely off since the end of April), anxiety as well, but depression is most concerning    What is going well? Nothing right now    Life Stressors:quit her job, was unemployed for 3 months, but is now employed again    Other associated symptoms :anxious, afraid of failure, nausea, thoughts of not wanting to be around, sleeping more, no energy    How is your sleep? Falls alseep fine, but doesn't feel like it's restful sleep and wakes up with heart palpations, sometime sleeps until 4pm on weekends, and wanting to sleep more in general    New Significant life event:Yes-  the new job    Current substance abuse: Alcohol, drinks a little more than in the past, but not more since stopping her medication.  She drinks no drinks per week up to 2-3 cocktails/night once a week, but does't feel like it has increased and is a problem currently    Anxiety / Panic symptoms: Yes-  Wakes up with heart palpitations and feeling nauseous at work, worried about getting palpitations, worried about screwing up at work     Recent PHQ-9 Scores:      PHQ-9 SCORE 2/23/2017   Total Score 14     Recent CHARLI-7 Scores:      CHARLI-7 SCORE 2/23/2017   Total Score 7         Today' sPHQ 9 Reviewed and it is 21 and CHARLI 7 is 14, both are worsening           Adherence and Exercise  Medication side effects: not on medication at this time  How often is a medication missed? Tapered herself off along with her physician  Exercise:walking  6-7 days/week for an average of 15-30 minutes , walks at work and her dog in the morning, once a week to the [facility]        Concern:yeast infection   Description of the problem :Never picked up medication in September.  Still  having symptoms of itching.  [facility]         Concern: left hand lesion   Description of the problem :Been there since August, burned it and came back, do not itch, burn, or bother.  Will address this at next visit.     Problem, Medication and Allergy Lists were reviewed and are current.  Patient is an established patient of this clinic.         Review of Systems:   Review of Systems   Constitutional: Positive for activity change and fatigue. Negative for chills and fever.   Respiratory: Negative for cough and shortness of breath.    Cardiovascular: Positive for palpitations.   Gastrointestinal: Positive for nausea. Negative for abdominal pain, constipation, diarrhea and vomiting.   Neurological: Negative for dizziness and headaches.   Psychiatric/Behavioral: Positive for decreased concentration, dysphoric mood, sleep disturbance and suicidal ideas (No plan, but is at the point of not wanting to be around). The patient is nervous/anxious.              Physical Exam:     Patient Vitals for the past 24 hrs:   BP Temp Temp src Pulse Resp SpO2 Weight   10/17/17 0821 119/72 97.5  F (36.4  C) Oral 77 16 98 % 184 lb (83.5 kg)     Body mass index is 30.62 kg/(m^2).  Vitals were reviewed and were normal     Physical Exam   Constitutional: She appears well-developed and well-nourished. No distress.   Cardiovascular: Normal rate, regular rhythm, normal heart sounds and intact distal pulses.  Exam reveals no gallop and no friction rub.    No murmur heard.  Pulmonary/Chest: Effort normal and breath sounds normal. No respiratory distress. She has no wheezes. She has no rales.   Psychiatric: Her mood appears not anxious. Her speech is not rapid and/or pressured and not slurred. She is not agitated and not withdrawn.   Patient was tearful during interview discussing her depression and anxiety.  States that she does not want to be around, but has no plans of suicide         Results:     none  Assessment and Plan      Mariana was seen today for refill request.    Diagnoses and all orders for this visit:    Moderate episode of recurrent major depressive disorder (H) - Patient was see in Feb for a taper off her depression/anxiety medications because she didn't feel like they were helping. Since then she has been completely off all medications since late April and feeling much worse.  Quit her job and was unemployed for 3 months.  Has a new job now.  Still smoking, but only a cigarette every two weeks.  Discussed complete cessation.  Drinks occasionally, 2-3 drinks on a night out once or twice a month.  Discussed limiting drinking to 1-2 drinks on nights out.  I have low suspicion for abuse of alcohol at this time. Encouraged exercise daily, walking her dog, and eating a balanced diet.  Talked about enrolling in healthcare home and patient was ok with that. Process started.  Talked about thoughts of not wanting to be around and gave her resources for 24 hour call line if her thoughts got worse or she has a plan for suicide.  Plan to follow up with a phone call in 2 weeks and a visit in 4 weeks to check in on how things are going.  -     venlafaxine (EFFEXOR-XR) 37.5 MG 24 hr capsule; Take 1 capsule daily for 14 days, then take 2 capsules daily.    Candidiasis of vagina - Was prescribed medication in late September, but she never picked it up.  Still have itching and discharge.  Resent RX to pharmacy/  -     fluconazole (DIFLUCAN) 150 MG tablet; Take 1 tablet (150 mg) by mouth once for 1 dose    Declined flu vaccine.    Medications Discontinued During This Encounter   Medication Reason     buPROPion (WELLBUTRIN XL) 150 MG 24 hr tablet Stopped by Patient     venlafaxine (EFFEXOR-XR) 37.5 MG 24 hr capsule Reorder     fluconazole (DIFLUCAN) 150 MG tablet Reorder     Options for treatment and follow-up care were reviewed with the patient. Mariana Mckeon  engaged in the decision making process and verbalized understanding of the options  discussed and agreed with the final plan.    Zara Velasquez MD

## 2017-10-18 ASSESSMENT — ANXIETY QUESTIONNAIRES: GAD7 TOTAL SCORE: 14

## 2017-11-01 ENCOUNTER — TELEPHONE (OUTPATIENT)
Dept: FAMILY MEDICINE | Facility: CLINIC | Age: 37
End: 2017-11-01

## 2017-11-01 NOTE — TELEPHONE ENCOUNTER
11/1/17    At visit on 10/17/17, we started a new medication for her anxiety and depresion symptoms.  I called to see how the medication was going and to see how her mood has been.  Reminded her of a recommended follow up visit in 4 weeks (2 more weeks) and requested that she call back to let me know how things were going.    Zara Velasquez MD  [facility] Resident PGY - 1

## 2017-11-14 ENCOUNTER — OFFICE VISIT (OUTPATIENT)
Dept: FAMILY MEDICINE | Facility: CLINIC | Age: 37
End: 2017-11-14

## 2017-11-14 VITALS
DIASTOLIC BLOOD PRESSURE: 74 MMHG | TEMPERATURE: 97.9 F | WEIGHT: 180.6 LBS | RESPIRATION RATE: 16 BRPM | BODY MASS INDEX: 30.05 KG/M2 | SYSTOLIC BLOOD PRESSURE: 117 MMHG | HEART RATE: 84 BPM | OXYGEN SATURATION: 97 %

## 2017-11-14 DIAGNOSIS — F33.1 MODERATE EPISODE OF RECURRENT MAJOR DEPRESSIVE DISORDER (H): Primary | ICD-10-CM

## 2017-11-14 DIAGNOSIS — F33.1 MODERATE EPISODE OF RECURRENT MAJOR DEPRESSIVE DISORDER (H): ICD-10-CM

## 2017-11-14 RX ORDER — VENLAFAXINE HYDROCHLORIDE 37.5 MG/1
CAPSULE, EXTENDED RELEASE ORAL
Qty: 46 CAPSULE | Refills: 0 | Status: CANCELLED | OUTPATIENT
Start: 2017-11-14

## 2017-11-14 RX ORDER — VENLAFAXINE HYDROCHLORIDE 75 MG/1
75 CAPSULE, EXTENDED RELEASE ORAL DAILY
Qty: 90 CAPSULE | Refills: 3 | Status: SHIPPED | OUTPATIENT
Start: 2017-11-14 | End: 2018-03-27

## 2017-11-14 ASSESSMENT — ANXIETY QUESTIONNAIRES
5. BEING SO RESTLESS THAT IT IS HARD TO SIT STILL: NOT AT ALL
GAD7 TOTAL SCORE: 10
1. FEELING NERVOUS, ANXIOUS, OR ON EDGE: NEARLY EVERY DAY
IF YOU CHECKED OFF ANY PROBLEMS ON THIS QUESTIONNAIRE, HOW DIFFICULT HAVE THESE PROBLEMS MADE IT FOR YOU TO DO YOUR WORK, TAKE CARE OF THINGS AT HOME, OR GET ALONG WITH OTHER PEOPLE: SOMEWHAT DIFFICULT
6. BECOMING EASILY ANNOYED OR IRRITABLE: SEVERAL DAYS
3. WORRYING TOO MUCH ABOUT DIFFERENT THINGS: MORE THAN HALF THE DAYS
7. FEELING AFRAID AS IF SOMETHING AWFUL MIGHT HAPPEN: NOT AT ALL
2. NOT BEING ABLE TO STOP OR CONTROL WORRYING: MORE THAN HALF THE DAYS

## 2017-11-14 ASSESSMENT — PATIENT HEALTH QUESTIONNAIRE - PHQ9
SUM OF ALL RESPONSES TO PHQ QUESTIONS 1-9: 18
5. POOR APPETITE OR OVEREATING: MORE THAN HALF THE DAYS

## 2017-11-14 NOTE — PATIENT INSTRUCTIONS
"Follow up plan  Please make a clinic appointment for follow up with me (JOHN PADMINILISA LORENZANA) in 1  month for depression and sleep and jaw pain follow up.    Jaw pain/TMJ   - resistance exercises, several times per day, reps of 10    - Up/down    - Forward thrust   - Dentist!    - consider splint/nightgaurd    Sleep problems  - Melatonin 3-5mg nightly    - fast dissolving right before bed, otherwise if swallowed capsule with dinner   - try nightly for 1 month (can be as needed after that)  - Next step would be sleep med referral      Depression  - continue venlafaxine 75mg daily    Recommendations for Insomnia  1. Only go to bed when sleepy.  2. If unable to fall asleep within 20 minutes, get out of bed, go to a different room and read, preferable something uninteresting.  3. Remove or cover up the clock in your bedroom.  3. Avoid caffeine 6 hours before bed.  4. Avoid watching TV one hour before bed time especially not  in bed or leaving the TV on all night.  5. Designate the hour before your bed time as \"worry time.\" Keep a paper to-do list, make a new list every night before going to bed. The goal is for this list to reduce mind racing.   6. Try to exercise 30-60 minutes daily-but avoid vigorous exercise 4-6 hours before bed  7. Try a meditation exercise called the Body Scan  8. Consider sleep/meditation xu        Here is the plan from today's visit    1. Moderate episode of recurrent major depressive disorder (H)  - venlafaxine (EFFEXOR-XR) 75 MG 24 hr capsule; Take 1 capsule (75 mg) by mouth daily  Dispense: 90 capsule; Refill: 3    Thank you for coming to [facility] today.  Lab Testing:  **If you had lab testing today and your results are reassuring or normal they will be mailed to you or sent through [employer] within 7 days.   **If the lab tests need quick action we will call you with the results.  The phone number we will call with results is # [phone] (home) . If this is not the best number please " call our clinic and change the number.  Medication Refills:  If you need any refills please call your pharmacy and they will contact us.   If you need to  your refill at a new pharmacy, please contact the new pharmacy directly. The new pharmacy will help you get your medications transferred faster.   Scheduling:  If you have any concerns about today's visit or wish to schedule another appointment please call our office during normal business hours [phone] (8-5:00 M-F)  If a referral was made to a [facility] and you don't get a call from central scheduling please call [phone].  If a Mammogram was ordered for you at [facility] call [phone] to schedule or change your appointment.  If you had an XRay/CT/Ultrasound/MRI ordered the number is [phone] to schedule or change your radiology appointment.   Medical Concerns:  If you have urgent medical concerns please call [phone] at any time of the day.

## 2017-11-14 NOTE — TELEPHONE ENCOUNTER
Request for medication refill:    Date of last visit at clinic: 11-14-17    Please complete refill if appropriate and CLOSE ENCOUNTER.    Closing the encounter signifies the refill is complete.    If refill has been denied, please complete the smart phrase .smirefuse and route it to the [facility] RN TRIAGE pool to inform the patient and the pharmacy.    Ramona Malcolm, CMA

## 2017-11-14 NOTE — PROGRESS NOTES
"      HPI:       Mariana Mckeon is a 37 year old who presents for the following  Patient presents with:  Sleep Problem: waking up 8 or more times a night   Jaw Pain: uncomfortable mostly as night, clicking when yawning   Recheck Medication: refills     MDD and difficulty sleeping  Restarted venlafaxine 10/17/17 during visit with Dr. Velasquez.   This is the best fit so far, doesn't have night sweats/terrors  Happy to be back on it. Didn't feel bad for erwin mahajan to be off of it. Now things are more stressful, life and job stuff, gradually mood worsened.   No longer thinking \"I wish I didn't exist\"  Not enough momentum/energy to do anything other than go to work.   Goes to sleep at 8 or 10pm  Wakes up 6-8 times overnight for the past 3-4 months, usually can fall back asleep.   Has not used supplements or melatonin  Sees therapist weekly - Leslee Stein, considering CBT with diff therapist.   PHQ9=18  GAD7=10      Jaw pain  Worse  Can make jaw click. Yawn, jaw clicks, doesn't pop  Has always had some jaw problems, thinks it's undiagnosed TMJ  When sleeping/resting on either side, has jaw discomfort (tries to sleep on back because of this)  Never worn mouth gear/guard at night  Teeth grinding never reported by partners or noted by dentist.   Last dental visit 1 year ago    Problem, Medication and Allergy Lists were reviewed and are current.  Patient is an established patient of this clinic.         Review of Systems:   Review of Systems          Physical Exam:   Patient Vitals for the past 24 hrs:   BP Temp Temp src Pulse Resp SpO2 Weight   11/14/17 0847 117/74 97.9  F (36.6  C) Oral 84 16 97 % 180 lb 9.6 oz (81.9 kg)     Body mass index is 30.05 kg/(m^2).     Physical Exam   Constitutional: She appears well-developed and well-nourished.   HENT:   Head: Normocephalic and atraumatic.   Palpable jaw clicks bilaterally with TMJ pressure. Symmetric jaw opening and jaw thrust   Eyes: Conjunctivae are normal. " "  Cardiovascular: Normal rate.    Pulmonary/Chest: Effort normal. No respiratory distress.   Skin: Skin is warm and dry. No rash noted. No erythema. No pallor.   Vitals reviewed.      Assessment and Plan     Follow up plan  Please make a clinic appointment for follow up with me (PADMINI LOPEZ) in 1  month for depression and sleep and jaw pain follow up.    Jaw pain/TMJ   - resistance exercises, several times per day, reps of 10    - Up/down    - Forward thrust   - Dentist!    - consider splint/nightgaurd    Sleep problems  - Melatonin 3-5mg nightly    - fast dissolving right before bed, otherwise if swallowed capsule with dinner   - try nightly for 1 month (can be as needed after that)  - Next step would be sleep med referral    Depression  - continue venlafaxine (EFFEXOR-XR) 75 MG 24 hr capsule; Take 1 capsule (75 mg) by mouth daily  Dispense: 90 capsule; Refill: 3    Flu shot today    Recommendations for Insomnia  1. Only go to bed when sleepy.  2. If unable to fall asleep within 20 minutes, get out of bed, go to a different room and read, preferable something uninteresting.  3. Remove or cover up the clock in your bedroom.  3. Avoid caffeine 6 hours before bed.  4. Avoid watching TV one hour before bed time especially not  in bed or leaving the TV on all night.  5. Designate the hour before your bed time as \"worry time.\" Keep a paper to-do list, make a new list every night before going to bed. The goal is for this list to reduce mind racing.   6. Try to exercise 30-60 minutes daily-but avoid vigorous exercise 4-6 hours before bed  7. Try a meditation exercise called the Body Scan  8. Consider sleep/meditation xu    There are no discontinued medications.  Options for treatment and follow-up care were reviewed with the patient. Mariana Mckeon  engaged in the decision making process and verbalized understanding of the options discussed and agreed with the final plan.    I spent 25 min face to face with the " patient and >50% was spent counselling the patient about the above medical conditions, educating patient and discussing the recommendations and followup .    Katt Cheema MD   of [facility]

## 2017-11-14 NOTE — MR AVS SNAPSHOT
"              After Visit Summary   11/14/2017    Mariana Mckeon    MRN: [id number]           Patient Information     Date Of Birth          1980        Visit Information        Provider Department      11/14/2017 8:40 AM Padmini Lopez MD [facility]        Today's Diagnoses     Moderate episode of recurrent major depressive disorder (H)          Care Instructions    Follow up plan  Please make a clinic appointment for follow up with me (PADMINI LOPEZ) in 1  month for depression and sleep and jaw pain follow up.    Jaw pain/TMJ   - resistance exercises, several times per day, reps of 10    - Up/down    - Forward thrust   - Dentist!    - consider splint/nightgaurd    Sleep problems  - Melatonin 3-5mg nightly    - fast dissolving right before bed, otherwise if swallowed capsule with dinner   - try nightly for 1 month (can be as needed after that)  - Next step would be sleep med referral      Depression  - continue venlafaxine 75mg daily    Recommendations for Insomnia  1. Only go to bed when sleepy.  2. If unable to fall asleep within 20 minutes, get out of bed, go to a different room and read, preferable something uninteresting.  3. Remove or cover up the clock in your bedroom.  3. Avoid caffeine 6 hours before bed.  4. Avoid watching TV one hour before bed time especially not  in bed or leaving the TV on all night.  5. Designate the hour before your bed time as \"worry time.\" Keep a paper to-do list, make a new list every night before going to bed. The goal is for this list to reduce mind racing.   6. Try to exercise 30-60 minutes daily-but avoid vigorous exercise 4-6 hours before bed  7. Try a meditation exercise called the Body Scan  8. Consider sleep/meditation xu        Here is the plan from today's visit    1. Moderate episode of recurrent major depressive disorder (H)  - venlafaxine (EFFEXOR-XR) 75 MG 24 hr capsule; Take 1 capsule (75 mg) by mouth daily  Dispense: 90 capsule; " Refill: 3    Thank you for coming to [facility] today.  Lab Testing:  **If you had lab testing today and your results are reassuring or normal they will be mailed to you or sent through UZwan within 7 days.   **If the lab tests need quick action we will call you with the results.  The phone number we will call with results is # [phone] (home) . If this is not the best number please call our clinic and change the number.  Medication Refills:  If you need any refills please call your pharmacy and they will contact us.   If you need to  your refill at a new pharmacy, please contact the new pharmacy directly. The new pharmacy will help you get your medications transferred faster.   Scheduling:  If you have any concerns about today's visit or wish to schedule another appointment please call our office during normal business hours [phone] (8-5:00 M-F)  If a referral was made to a [facility] and you don't get a call from central scheduling please call [phone].  If a Mammogram was ordered for you at [facility] call [phone] to schedule or change your appointment.  If you had an XRay/CT/Ultrasound/MRI ordered the number is [phone] to schedule or change your radiology appointment.   Medical Concerns:  If you have urgent medical concerns please call [phone] at any time of the day.            Follow-ups after your visit        Who to contact     Please call your clinic at [phone] to:    Ask questions about your health    Make or cancel appointments    Discuss your medicines    Learn about your test results    Speak to your doctor   If you have compliments or concerns about an experience at your clinic, or if you wish to file a complaint, please contact [facility] Patient Relations at [phone] or email us at [email or web address]         Additional Information About Your Visit        UZwan Information      [employer] is an electronic gateway that provides easy, online access to your medical records. With [employer], you can request a clinic appointment, read your test results, renew a prescription or communicate with your care team.     To sign up for [employer] visit the website at [email or web address]   You will be asked to enter the access code listed below, as well as some personal information. Please follow the directions to create your username and password.     Your access code is: [email or web address]  Expires: 2017 12:49 PM     Your access code will  in 90 days. If you need help or a new code, please contact your [facility] or call [phone] for assistance.        Care EveryWhere ID     This is your Care EveryWhere ID. This could be used by other organizations to access your [facility] medical records  [id number]        Your Vitals Were     Pulse Temperature Respirations Last Period Pulse Oximetry Breastfeeding?    84 97.9  F (36.6  C) (Oral) 16 10/01/2017 (LMP Unknown) 97% No    BMI (Body Mass Index)                   30.05 kg/m2            Blood Pressure from Last 3 Encounters:   17 117/74   10/17/17 119/72   17 117/78    Weight from Last 3 Encounters:   17 180 lb 9.6 oz (81.9 kg)   10/17/17 184 lb (83.5 kg)   17 186 lb 3.2 oz (84.5 kg)              Today, you had the following     No orders found for display         Today's Medication Changes          These changes are accurate as of: 17  9:19 AM.  If you have any questions, ask your nurse or doctor.               These medicines have changed or have updated prescriptions.        Dose/Directions    venlafaxine 75 MG 24 hr capsule   Commonly known as:  EFFEXOR-XR   This may have changed:    - medication strength  - how much to take  - how to take this  - when to take this  - additional instructions   Used for:  Moderate episode of recurrent major depressive disorder (H)   Changed by:   Katt Cheema MD        Dose:  75 mg   Take 1 capsule (75 mg) by mouth daily   Quantity:  90 capsule   Refills:  3            Where to get your medicines      These medications were sent to [facility] [address] [place] [facility] - [place], [place] - [address]  [address] [address]     Phone:  [phone]     venlafaxine 75 MG 24 hr capsule                Primary Care Provider Office Phone # Fax #    Katt Cheema [phone]       [address] [facility] [address]        Equal Access to Services     [facility]: Hadii aad ku hadasho Soomaali, waaxda luqadaha, qaybta kaalmada adeegyada, waxay idiin hayarron navas . So [facility] [phone].    ATENCIÓN: Si habla español, tiene a andre disposición servicios gratuitos de asistencia lingüística. [facility] [phone].    We comply with applicable federal civil rights laws and [place] laws. We do not discriminate on the basis of race, color, national origin, age, disability, sex, sexual orientation, or gender identity.            Thank you!     Thank you for choosing [facility]  for your care. Our goal is always to provide you with excellent care. Hearing back from our patients is one way we can continue to improve our services. Please take a few minutes to complete the written survey that you may receive in the mail after your visit with us. Thank you!             Your Updated Medication List - Protect others around you: Learn how to safely use, store and throw away your medicines at www.disposemymeds.org.          This list is accurate as of: 11/14/17  9:19 AM.  Always use your most recent med list.                   Brand Name Dispense Instructions for use Diagnosis    NO ACTIVE MEDICATIONS      .        venlafaxine 75 MG 24 hr capsule    EFFEXOR-XR    90 capsule    Take 1 capsule (75 mg) by mouth daily    Moderate episode of recurrent major depressive disorder (H)

## 2017-11-15 ASSESSMENT — ANXIETY QUESTIONNAIRES: GAD7 TOTAL SCORE: 10

## 2018-02-06 DIAGNOSIS — B37.31 CANDIDIASIS OF VAGINA: ICD-10-CM

## 2018-02-06 RX ORDER — FLUCONAZOLE 150 MG/1
150 TABLET ORAL ONCE
Qty: 1 TABLET | Refills: 0 | OUTPATIENT
Start: 2018-02-06 | End: 2018-02-06

## 2018-02-06 NOTE — TELEPHONE ENCOUNTER
[facility] phone call message- patient requesting a refill:    Full Medication Name: fluconazole (DIFLUCAN) 150 MG tablet        Pharmacy confirmed as     [id number] - [place], [place] - [address]  [address] [address]  Phone: [phone] Fax: [phone]  : Yes    Additional Comments: Pt is requesting Rx above to be sent to her pharmacy.      OK to leave a message on voice mail? Yes    Primary language: English      needed? No    Call taken on February 6, 2018 at 9:10 AM by Carolee Salomon

## 2018-02-06 NOTE — TELEPHONE ENCOUNTER
Tried to reach patient on cell phone.  Unable to leave a message as vm is not set up yet.  They will need an appt to get the refill requested.

## 2018-02-06 NOTE — TELEPHONE ENCOUNTER
Patient needs appointment in clinic for diflucan. Please call patient to schedule.    Stephanie Villanueva RN

## 2018-02-06 NOTE — TELEPHONE ENCOUNTER
Medication Refill Denied  Reason: Patient needs: provider visit  Provider: I have not called the patient about the Rx denial, please call.  PCS: Please notify the pharmacy  RN: Please contact the patient to explain reasoning provided above and to schedule the patient for a provider visit with any provider..    RN may not order temporary refill so that the patient will not run out of medication prior to the scheduled visit.    Katt Cheema MD

## 2018-02-06 NOTE — TELEPHONE ENCOUNTER
Patient returned missed call. Scheduled patient for appointment next week.    Angela Patino, Patient Representative

## 2018-02-06 NOTE — TELEPHONE ENCOUNTER
Date of last visit at clinic: 11/14/17    Please complete refill and CLOSE ENCOUNTER.  Closing the encounter signifies the refill is complete.      Stephanie Villanueva RN

## 2018-03-27 DIAGNOSIS — F33.1 MODERATE EPISODE OF RECURRENT MAJOR DEPRESSIVE DISORDER (H): ICD-10-CM

## 2018-03-27 RX ORDER — VENLAFAXINE HYDROCHLORIDE 75 MG/1
75 CAPSULE, EXTENDED RELEASE ORAL DAILY
Qty: 90 CAPSULE | Refills: 3 | Status: SHIPPED | OUTPATIENT
Start: 2018-03-27 | End: 2018-10-02

## 2018-03-27 NOTE — TELEPHONE ENCOUNTER
Request for medication refill:    Date of last visit at clinic: 11-14-17    Please complete refill if appropriate and CLOSE ENCOUNTER.    Closing the encounter signifies the refill is complete.    If refill has been denied, please complete the smart phrase .smirefuse and route it to the [facility] RN TRIAGE pool to inform the patient and the pharmacy.    ALEXANDRO GUILLEN, CMA

## 2018-10-02 ENCOUNTER — OFFICE VISIT (OUTPATIENT)
Dept: FAMILY MEDICINE | Facility: CLINIC | Age: 38
End: 2018-10-02
Payer: COMMERCIAL

## 2018-10-02 VITALS
TEMPERATURE: 98.7 F | BODY MASS INDEX: 32.55 KG/M2 | DIASTOLIC BLOOD PRESSURE: 82 MMHG | OXYGEN SATURATION: 98 % | WEIGHT: 195.6 LBS | RESPIRATION RATE: 16 BRPM | HEART RATE: 80 BPM | SYSTOLIC BLOOD PRESSURE: 127 MMHG

## 2018-10-02 DIAGNOSIS — F41.1 ANXIETY, GENERALIZED: ICD-10-CM

## 2018-10-02 DIAGNOSIS — F33.1 MODERATE EPISODE OF RECURRENT MAJOR DEPRESSIVE DISORDER (H): ICD-10-CM

## 2018-10-02 DIAGNOSIS — N89.8 VAGINAL DISCHARGE: Primary | ICD-10-CM

## 2018-10-02 LAB
BACTERIA: NORMAL
CLUE CELLS: NORMAL
MOTILE TRICHOMONAS: NEGATIVE
ODOR: NORMAL
PH WET PREP: <4.5
WBC WET PREP: NORMAL
YEAST: NORMAL

## 2018-10-02 RX ORDER — VENLAFAXINE HYDROCHLORIDE 75 MG/1
75 CAPSULE, EXTENDED RELEASE ORAL DAILY
Qty: 90 CAPSULE | Refills: 3 | Status: SHIPPED | OUTPATIENT
Start: 2018-10-02 | End: 2019-04-10

## 2018-10-02 ASSESSMENT — ANXIETY QUESTIONNAIRES
6. BECOMING EASILY ANNOYED OR IRRITABLE: NEARLY EVERY DAY
IF YOU CHECKED OFF ANY PROBLEMS ON THIS QUESTIONNAIRE, HOW DIFFICULT HAVE THESE PROBLEMS MADE IT FOR YOU TO DO YOUR WORK, TAKE CARE OF THINGS AT HOME, OR GET ALONG WITH OTHER PEOPLE: SOMEWHAT DIFFICULT
1. FEELING NERVOUS, ANXIOUS, OR ON EDGE: NEARLY EVERY DAY
7. FEELING AFRAID AS IF SOMETHING AWFUL MIGHT HAPPEN: SEVERAL DAYS
3. WORRYING TOO MUCH ABOUT DIFFERENT THINGS: SEVERAL DAYS
2. NOT BEING ABLE TO STOP OR CONTROL WORRYING: MORE THAN HALF THE DAYS
5. BEING SO RESTLESS THAT IT IS HARD TO SIT STILL: NOT AT ALL
GAD7 TOTAL SCORE: 10

## 2018-10-02 ASSESSMENT — PATIENT HEALTH QUESTIONNAIRE - PHQ9: 5. POOR APPETITE OR OVEREATING: NOT AT ALL

## 2018-10-02 NOTE — PROGRESS NOTES
Preceptor Attestation:   Patient seen, evaluated and discussed with the resident. I have verified the content of the note, which accurately reflects my assessment of the patient and the plan of care. Suspect discharge is physiologic discharge based on resident description and lack of findings on Wet Prep.   Supervising Physician:  Juan José Adams MD

## 2018-10-02 NOTE — PROGRESS NOTES
HPI       Mariana Mckeon is a 38 year old  who presents for   Chief Complaint   Patient presents with     Vaginal Problem     discharge with odor, possible BV      Anxiety     and depression concerns-concerns about auditory/sensory and smell processes     Fatigue     with back pain         Depression/Anxiety follow up      Your mood since your last visit: Worse due to anxiety, not as much her depression, more feelings of hope than before    Medication? venlafaxine    Therapy? Yes, sees once a week    Exercise? Cl if she gets to take her dog for a walk, normally gets in bed and stays there    What is going well? Has a temporary job for now    Life Stressors: fired from jobs recently and anxious that this will happen again    Other associated symptoms :fatigue    How is your sleep? Fair, wakes up a few times at night to use the bathroom    New significant life event:Yes-  Fired from jobs    Current substance use: Drinks alcohol infrequently, but when she does drink it's 3-4 beers in a setting. Occasionally smokes marijuana (when friends have it)    Anxiety / Panic symptoms: Yes-  More than depresison        Recent PHQ-9 Scores:     PHQ-9 SCORE 10/17/2017 11/14/2017 10/2/2018   Total Score 21 18 11     Recent CHARLI-7 Scores:      CHARLI-7 SCORE 10/17/2017 11/14/2017 10/2/2018   Total Score 14 10 10         Today' sPHQ 9 Reviewed and it is 11, improving and CHARLI 7 is 10, the same      +++++++    Vaginal Symptoms  Onset: ongoing, sometimes smells more than others    Description:  Vaginal Discharge: creamy   Itching (Pruritis): no  Burning sensation: no  Odor: Yes Details:     Accompanying Signs & Symptoms:  Pain with Urination:no  Abdominal Pain: no  Fever: no    History:   Sexually active: no  New Partner: no  Possibility of Pregnancy:  No    Precipitating factors:   Recent Antibiotic Use: no     Alleviating factors:  no  Therapies Tried and outcome: none, never been treated before for BV    Problem, Medication and  Allergy Lists were reviewed and updated if needed..    Patient is an established patient of this clinic..         Review of Systems:   Review of Systems  Negative except for noted in HPI       Physical Exam:     Vitals:    10/02/18 0824   BP: 127/82   BP Location: Left arm   Patient Position: Sitting   Cuff Size: Adult Regular   Pulse: 80   Resp: 16   Temp: 98.7  F (37.1  C)   TempSrc: Oral   SpO2: 98%   Weight: 195 lb 9.6 oz (88.7 kg)     Body mass index is 32.55 kg/(m^2).  Vital signs normal except BMI was elevated     Physical Exam   Constitutional: She appears well-developed and well-nourished. No distress.   HENT:   Head: Normocephalic and atraumatic.   Eyes: Conjunctivae are normal.   Cardiovascular: Normal rate.    Pulmonary/Chest: Effort normal.   Skin: She is not diaphoretic.   Psychiatric: She has a normal mood and affect. Her behavior is normal. Judgment and thought content normal.         Results:      Results from this visit  Results for orders placed or performed in visit on 10/02/18   Wet Prep ([facility])   Result Value Ref Range    Yeast Wet Prep None none    Motile Trichomonas Wet Prep Negative Negative    Clue Cells Wet Prep None NONE    WBC WET PREP None 2 - 5    Bacteria Wet Prep Few None    pH Wet Prep <4.5 3.8 - 4.5    Odor Wet Prep None NONE       Assessment and Plan        Mariana was seen today for vaginal problem, anxiety and fatigue.    Diagnoses and all orders for this visit:    Vaginal discharge - Patient elected to self swab. Has noticed this discharge in the last year. Has never been treated for BV during this time or requested testing, but concerned that is what it is. 9/19/17 had negative CT/GC, HIV/RPR, pap/HPV cotesting, but was positive for yeast on wet prep. No sexual intercourse during this last year. Wet prep was negative. Offered pelvic exam, but patient deferred at this time. Stated that if discharge persisted, then she would come in for exam. Do not feel she needs STI testing  at this time.   -     Wet Prep ([facility])    Moderate episode of recurrent major depressive disorder (H)  Anxiety, generalized  Restarted venlafaxine 1017/17. Depression symptoms improved, but anxiety still the same and affecting her ability to hold a job. She has a temporary job now that she could be considered for permanent work. However, she gets anxious about screwing up like she did last time and that makes it hard to concentrate and she eventually does screw up. She is interested in medical marijuana, but informed her that anxiety is not one of the approved diagnosis for use. States she will try CBD oil which she can get OTC. She would like to start CBT, but her current therapist does not do CBT. Discussed going up on venlafaxine and/or a referral for CBT. Patient comfortable on her dose of venlafaxine and would like to try CBT. Behavioral health referral made. Would like to see her back in 2-3 months to see how CBT is going.  -     venlafaxine (EFFEXOR-XR) 75 MG 24 hr capsule; Take 1 capsule (75 mg) by mouth daily  -     BEHAVIORAL HEALTH REFERRAL ([facility] interal and external)           Medications Discontinued During This Encounter   Medication Reason     venlafaxine (EFFEXOR-XR) 75 MG 24 hr capsule Reorder       Options for treatment and follow-up care were reviewed with the patient. Mariana Mckeon  engaged in the decision making process and verbalized understanding of the options discussed and agreed with the final plan.    Zara Velasquez MD

## 2018-10-02 NOTE — MR AVS SNAPSHOT
After Visit Summary   10/2/2018    Mariana Mckeon    MRN: [id number]           Patient Information     Date Of Birth          1980        Visit Information        Provider Department      10/2/2018 8:20 AM Zara Velasquez MD [facility]        Today's Diagnoses     Vaginal discharge    -  1    Moderate episode of recurrent major depressive disorder (H)        Anxiety, generalized          Care Instructions    Here is the plan from today's visit    1. Vaginal discharge  No infection seen on wet prep  - Wet Prep ([facility])    2. Moderate episode of recurrent major depressive disorder (H)  Continue with current dose of venlafaxine  - venlafaxine (EFFEXOR-XR) 75 MG 24 hr capsule; Take 1 capsule (75 mg) by mouth daily  Dispense: 90 capsule; Refill: 3    3. Anxiety, generalized  Referral for CBT. They will call to set up appt.  - BEHAVIORAL HEALTH REFERRAL ([facility] interal and external)      Please call or return to clinic if your symptoms don't go away.    Follow up plan  Please make a clinic appointment for follow up with your primary physician Katt Cheema MD in 3-6 months to see how therapy is going.    Thank you for coming to [facility] today.  Lab Testing:  **If you had lab testing today and your results are reassuring or normal they will be mailed to you or sent through Saranas within 7 days.   **If the lab tests need quick action we will call you with the results.  The phone number we will call with results is # [phone] (home) . If this is not the best number please call our clinic and change the number.  Medication Refills:  If you need any refills please call your pharmacy and they will contact us.   If you need to  your refill at a new pharmacy, please contact the new pharmacy directly. The new pharmacy will help you get your medications transferred faster.   Scheduling:  If you have any concerns about today's visit or wish to schedule another  appointment please call our office during normal business hours [phone] (8-5:00 M-F)  If a referral was made to a [facility] and you don't get a call from central scheduling please call [phone].  If a Mammogram was ordered for you at [facility] call [phone] to schedule or change your appointment.  If you had an XRay/CT/Ultrasound/MRI ordered the number is [phone] to schedule or change your radiology appointment.   Medical Concerns:  If you have urgent medical concerns please call [phone] at any time of the day.    Bakari Velasquez MD          Follow-ups after your visit        Additional Services     BEHAVIORAL HEALTH REFERRAL ([facility] interal and external)       Referring MD: BAKARI VELASQUEZ    May leave message on voicemail: Yes  PHQ-9 Score: 11  GAD7 Score: 10                  Who to contact     Please call your clinic at [phone] to:    Ask questions about your health    Make or cancel appointments    Discuss your medicines    Learn about your test results    Speak to your doctor            Additional Information About Your Visit        [employer] Information     [employer] is an electronic gateway that provides easy, online access to your medical records. With [employer], you can request a clinic appointment, read your test results, renew a prescription or communicate with your care team.     To sign up for [employer] visit the website at [email or web address]   You will be asked to enter the access code listed below, as well as some personal information. Please follow the directions to create your username and password.     Your access code is: [email or web address]  Expires: 2018  8:15 AM     Your access code will  in 90 days. If you need help or a new code, please contact your [facility] or call [phone] for assistance.        Care EveryWhere ID     This is your Care EveryWhere ID. This could be used by other  organizations to access your [facility] medical records  [id number]        Your Vitals Were     Pulse Temperature Respirations Last Period Pulse Oximetry Breastfeeding?    80 98.7  F (37.1  C) (Oral) 16 09/13/2018 98% No    BMI (Body Mass Index)                   32.55 kg/m2            Blood Pressure from Last 3 Encounters:   10/02/18 127/82   11/14/17 117/74   10/17/17 119/72    Weight from Last 3 Encounters:   10/02/18 195 lb 9.6 oz (88.7 kg)   11/14/17 180 lb 9.6 oz (81.9 kg)   10/17/17 184 lb (83.5 kg)              We Performed the Following     BEHAVIORAL HEALTH REFERRAL ([facility] inter[facility] and external)     Wet Prep ([facility])          Where to get your medicines      These medications were sent to [place] [address] [place] [address] [address]     Phone:  [phone]     venlafaxine 75 MG 24 hr capsule          Primary Care Provider Office Phone # Fax #    Katt Cheema [phone]       [address] [facility] [address]        Equal Access to Services     [facility]: Hadii aad ryan hadjasono Sotasia, waaxda luqadaha, qaybta kaalmada adeegyada, sophie navas . So [facility] [phone].    ATENCIÓN: Si habla español, tiene a andre disposición servicios gratuitos de asistencia lingüística. Llame al [phone].    We comply with applicable federal civil rights laws and [place] laws. We do not discriminate on the basis of race, color, national origin, age, disability, sex, sexual orientation, or gender identity.            Thank you!     Thank you for choosing [facility]  for your care. Our goal is always to provide you with excellent care. Hearing back from our patients is one way we can continue to improve our services. Please take a few minutes to complete the written survey that you may receive in the mail after your visit with us. Thank you!             Your Updated Medication List -  Protect others around you: Learn how to safely use, store and throw away your medicines at www.disposemymeds.org.          This list is accurate as of 10/2/18  9:08 AM.  Always use your most recent med list.                   Brand Name Dispense Instructions for use Diagnosis    NO ACTIVE MEDICATIONS      .        venlafaxine 75 MG 24 hr capsule    EFFEXOR-XR    90 capsule    Take 1 capsule (75 mg) by mouth daily    Moderate episode of recurrent major depressive disorder (H)

## 2018-10-02 NOTE — PATIENT INSTRUCTIONS
Here is the plan from today's visit    1. Vaginal discharge  No infection seen on wet prep  - Wet Prep (Rachel)    2. Moderate episode of recurrent major depressive disorder (H)  Continue with current dose of venlafaxine  - venlafaxine (EFFEXOR-XR) 75 MG 24 hr capsule; Take 1 capsule (75 mg) by mouth daily  Dispense: 90 capsule; Refill: 3    3. Anxiety, generalized  Referral for CBT. They will call to set up appt.  - BEHAVIORAL HEALTH REFERRAL ([facility] and external)      Please call or return to clinic if your symptoms don't go away.    Follow up plan  Please make a clinic appointment for follow up with your primary physician Katt Cheema MD in 3-6 months to see how therapy is going.    Thank you for coming to [facility] today.  Lab Testing:  **If you had lab testing today and your results are reassuring or normal they will be mailed to you or sent through [employer] within 7 days.   **If the lab tests need quick action we will call you with the results.  The phone number we will call with results is # [phone] (home) . If this is not the best number please call our clinic and change the number.  Medication Refills:  If you need any refills please call your pharmacy and they will contact us.   If you need to  your refill at a new pharmacy, please contact the new pharmacy directly. The new pharmacy will help you get your medications transferred faster.   Scheduling:  If you have any concerns about today's visit or wish to schedule another appointment please call our office during normal business hours [phone] (8-5:00 M-F)  If a referral was made to a [facility] and you don't get a call from central scheduling please call [phone].  If a Mammogram was ordered for you at [facility] call [phone] to schedule or change your appointment.  If you had an XRay/CT/Ultrasound/MRI ordered the number is [phone] to schedule or change your radiology  appointment.   Medical Concerns:  If you have urgent medical concerns please call [phone] at any time of the day.    Zara Velasquez MD

## 2018-10-03 ENCOUNTER — TELEPHONE (OUTPATIENT)
Dept: PSYCHOLOGY | Facility: CLINIC | Age: 38
End: 2018-10-03

## 2018-10-03 NOTE — TELEPHONE ENCOUNTER
Mental Health Referral:  Please schedule with Dr. Collins      If you are unable to reach the patient after two phone attempts, please send a letter and close the encounter.      Thank you!

## 2018-10-05 ASSESSMENT — ANXIETY QUESTIONNAIRES: GAD7 TOTAL SCORE: 10

## 2018-10-05 ASSESSMENT — PATIENT HEALTH QUESTIONNAIRE - PHQ9: SUM OF ALL RESPONSES TO PHQ QUESTIONS 1-9: 11

## 2018-11-02 ENCOUNTER — CARE COORDINATION (OUTPATIENT)
Dept: FAMILY MEDICINE | Facility: CLINIC | Age: 38
End: 2018-11-02

## 2018-11-02 ENCOUNTER — OFFICE VISIT (OUTPATIENT)
Dept: PSYCHOLOGY | Facility: CLINIC | Age: 38
End: 2018-11-02
Payer: COMMERCIAL

## 2018-11-02 ENCOUNTER — TELEPHONE (OUTPATIENT)
Dept: FAMILY MEDICINE | Facility: CLINIC | Age: 38
End: 2018-11-02

## 2018-11-02 DIAGNOSIS — F33.1 MODERATE EPISODE OF RECURRENT MAJOR DEPRESSIVE DISORDER (H): Primary | ICD-10-CM

## 2018-11-02 ASSESSMENT — ANXIETY QUESTIONNAIRES
2. NOT BEING ABLE TO STOP OR CONTROL WORRYING: SEVERAL DAYS
IF YOU CHECKED OFF ANY PROBLEMS ON THIS QUESTIONNAIRE, HOW DIFFICULT HAVE THESE PROBLEMS MADE IT FOR YOU TO DO YOUR WORK, TAKE CARE OF THINGS AT HOME, OR GET ALONG WITH OTHER PEOPLE: SOMEWHAT DIFFICULT
4. TROUBLE RELAXING: SEVERAL DAYS
3. WORRYING TOO MUCH ABOUT DIFFERENT THINGS: SEVERAL DAYS
6. BECOMING EASILY ANNOYED OR IRRITABLE: MORE THAN HALF THE DAYS
GAD7 TOTAL SCORE: 7
5. BEING SO RESTLESS THAT IT IS HARD TO SIT STILL: NOT AT ALL
1. FEELING NERVOUS, ANXIOUS, OR ON EDGE: MORE THAN HALF THE DAYS
7. FEELING AFRAID AS IF SOMETHING AWFUL MIGHT HAPPEN: NOT AT ALL

## 2018-11-02 ASSESSMENT — PATIENT HEALTH QUESTIONNAIRE - PHQ9: SUM OF ALL RESPONSES TO PHQ QUESTIONS 1-9: 12

## 2018-11-02 NOTE — PROGRESS NOTES
Called Buddy Mcpherson, patient's current therapist, to coordinate care. Patient requesting social phobia-focused CBT here at \[facility]\"" but concurrently receiving care from Buddy Mcpherson. Need to discuss with this therapist before moving forward with plan so as not to duplicate services.     Left message requesting return call.

## 2018-11-02 NOTE — MR AVS SNAPSHOT
After Visit Summary   2018    Mariana Mckeon    MRN: [id number]           Patient Information     Date Of Birth          1980        Visit Information        Provider Department      2018 8:20 AM Mariana Collins, PhD [facility]        Today's Diagnoses     Moderate episode of recurrent major depressive disorder (H)    -  1       Follow-ups after your visit        Who to contact     Please call your clinic at [phone] to:    Ask questions about your health    Make or cancel appointments    Discuss your medicines    Learn about your test results    Speak to your doctor            Additional Information About Your Visit        MyChart Information     RESAAS is an electronic gateway that provides easy, online access to your medical records. With RESAAS, you can request a clinic appointment, read your test results, renew a prescription or communicate with your care team.     To sign up for [employer] visit the website at [email or web address]   You will be asked to enter the access code listed below, as well as some personal information. Please follow the directions to create your username and password.     Your access code is: [email or web address]  Expires: 2018  7:15 AM     Your access code will  in 90 days. If you need help or a new code, please contact your [facility] or call [phone] for assistance.        Care EveryWhere ID     This is your Care EveryWhere ID. This could be used by other organizations to access your [place] medical records  [id number]         Blood Pressure from Last 3 Encounters:   10/02/18 127/82   17 117/74   10/17/17 119/72    Weight from Last 3 Encounters:   10/02/18 195 lb 9.6 oz (88.7 kg)   17 180 lb 9.6 oz (81.9 kg)   10/17/17 184 lb (83.5 kg)              Today, you had the following     No orders found for display       Primary Care Provider Office Phone # Fax #    Katt Mehta  MD Anette [phone]       [address] [facility] [address]        Equal Access to Services     MARYCHUYMARILYN MARTELL : Hadii aad ku hadjasonludin Gab, jeremiahrenae longabhijitha, seevn ammymarion románreyna, sophie ronniin hayaan románcarolee gray laGearron domingo. So [facility] [phone].    ATENCIÓN: Si habla español, tiene a andre disposición servicios gratuitos de asistencia lingüística. Llame al [phone].    We comply with applicable federal civil rights laws and [place] laws. We do not discriminate on the basis of race, color, national origin, age, disability, sex, sexual orientation, or gender identity.            Thank you!     Thank you for choosing [facility]  for your care. Our goal is always to provide you with excellent care. Hearing back from our patients is one way we can continue to improve our services. Please take a few minutes to complete the written survey that you may receive in the mail after your visit with us. Thank you!             Your Updated Medication List - Protect others around you: Learn how to safely use, store and throw away your medicines at www.disposemymeds.org.          This list is accurate as of 11/2/18 11:59 PM.  Always use your most recent med list.                   Brand Name Dispense Instructions for use Diagnosis    NO ACTIVE MEDICATIONS      .        venlafaxine 75 MG 24 hr capsule    EFFEXOR-XR    90 capsule    Take 1 capsule (75 mg) by mouth daily    Moderate episode of recurrent major depressive disorder (H)

## 2018-11-02 NOTE — PROGRESS NOTES
"Primary Care Behavioral Health Consult Note    Meeting lasted: 40 minutes  Others present: None    Identifying Information and Presenting Problem:    Dr. Velasquez requested behavioral health consultation for this patient regarding possibility of initiating cognitive-behavioral treatment for depression/anxiety at our clinic.  The patient is a 38 year old individual that agreed to be seen by behavioral health today.    Topics Discussed/Interventions Provided:       Brief review of symptoms and impact on functioning: Patient reports long history of social anxiety (fear of negative evaluation by others, avoidance of social situations or tolerating social situations with great discomfort/distress, worry about social situations) and depression (fatigue, low mood, history of suicidal ideation but denies current suicidal ideation; denies ever having intent/plan). Patient is insightful in her description of how depression and social anxiety combine to create a \"vicious cycle\" of fatigue, worry, and social avoidance. Social anxiety is her major concern at this time, and it impairs her functioning in vocational and social domains. States she has quit jobs due to fear of having to interact with people. 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 This is an exhausting job as she is constantly worried about what people think of her. She is worried her social anxiety is impacting her performance at her work.      Current treatment:     Counseling: Patient states she has been receiving counseling at [facility] for 2.5 years. She typically goes every other week, though has not been in about 3 weeks due to scheduling difficulties. States that current therapist's treatment approach is \"very free flowing.\" States it has been helpful though she is interested in learning more concrete skills. She states she has discussed this preference " "with current therapist who was open to her seeing another therapist for CBT, as, per patient, the current therapist \"does not do CBT.\" She does not have another appointment set up with current therapist due to unpredictability in her job as a temp. Unclear if schedule issues would also impact therapy at our clinic.     Medication: patient taking venlafaxine 75 mg daily for depression. States this is helpful.    Other: patient also using CBD oil daily for treatment of social anxiety disorder. States she thinks it is helpful.    Plan moving forward: Discussed risks and benefits of duplication of services, including ethical and clinical challenges. Patient states that current therapist is aware of her desire for CBT and is supportive of this. I told her that ideally she would not get treatment for same problem with two different providers. She states understanding, saying that she is okay with \"taking a break\" from current therapist to focus on CBT. I would reach out to her current therapist to discuss and ensure that we are all in agreement and then give her a call back to schedule here at [facility] with me.     Brief psychoeducation about CBT for social anxiety: Patient gives verbal understanding of various components, including cognitive restructuring, exposure hierarchy, exposure, and likelihood of regular homework.    Patient completed ASHWIN for Buddy Mcpherson MA, at [facility]. See Media Tab and Care Team for information.     Assessment:   Mental Status: Mariana appeared generally alert and oriented. Dress was casual and appropriate to the weather and occasion. Grooming and hygiene were good. Eye contact was good. Speech was of normal volume and rate and was clear, coherent, and relevant. Mood was \"pretty good\" with congruent affect. Thought processes were relevant, logical and goal-directed. Thought content was WNL with no evidence of psychotic or paranoid features. She denies SI/HI or " self-harm, intent, or plans. Memory appeared grossly intact. Insight and judgment appeared good and patient exhibited good impulse control during the appointment.     PHQ-9 SCORE 11/14/2017 10/2/2018 11/2/2018   Total Score 18 11 12       CHARLI-7 SCORE 11/14/2017 10/2/2018 11/2/2018   Total Score 10 10 7     CAGE 1/4-thought I out to cut down  PTSD 1/4-constantly on guard  WHODAS =20    Diagnostic Considerations:    A complete diagnostic assessment was not performed at today's visit. Patient reports history of social anxiety disorder and major depressive disorder.     Plan:      Communicate and collaborate with patient's current therapist, Buddy Mcpherson at [facility] to solidify plan of patient taking break from regular therapy with him to pursue CBT for social anxiety at [facility].     I will communicate directly with patient following my conversation with Mr. Mcpherson.     If patient continues to be agreeable, and Mr. Mcpherson also in agreement, patient to be seen by me at [facility] for CBT for social anxiety disorder. Will complete DA/tx plan at first visit.

## 2018-11-02 NOTE — TELEPHONE ENCOUNTER
[facility] phone call message- general phone call:    Reason for call: ASHWIN Release    Action Desired: Buddy Mcpherson is calling for Dr. Collins. He is asking that she email over the ASHWIN to him. Alternately, his fax is [phone]    Return call needed: Yes    OK to leave a message on voice mail? Yes    Advised patient response may take up to 2 business days: Yes    Primary language: English      needed? No    Call taken on November 2, 2018 at 4:16 PM by Stephania Gannon    [facility] to [facility]

## 2018-11-03 ASSESSMENT — ANXIETY QUESTIONNAIRES: GAD7 TOTAL SCORE: 7

## 2018-11-08 NOTE — PROGRESS NOTES
Preceptor Attestation:  I have reviewed and agree with the behavioral health fellow's documentation for this visit.  I did not see the patient.  Supervising Clinical Psychologist:  Moraima Quintana PSYD LP

## 2018-11-08 NOTE — PROGRESS NOTES
Called patient. Explained to pt that I had communicated with her therapist Buddy and that he had said it was okay for her to come to [facility] for CBT. Discussed limiting appointments with Buddy so as not to interfere with one another treatments. Also told her to avoid scheduling multiple psychotherapy appointments on one day. She agrees. Scheduled weekly appointments starting on 11/28/2018. Routed message to  to ask them to schedule appointments. Will start CBT for social anxiety at that time.       Mariana Collins, PhD  Behavioral Health Fellow

## 2018-11-28 ENCOUNTER — OFFICE VISIT (OUTPATIENT)
Dept: PSYCHOLOGY | Facility: CLINIC | Age: 38
End: 2018-11-28
Payer: COMMERCIAL

## 2018-11-28 DIAGNOSIS — F40.10 SOCIAL ANXIETY DISORDER: Primary | ICD-10-CM

## 2018-11-28 NOTE — PROGRESS NOTES
Behavioral Health Diagnostic Assessment:    Meeting was: scheduled  Others present: Patient  Meeting lasted: 35 minutes  Client was: on time    Assessment Summary: Diagnostic completed today. The patient is a 38 year old Choose not to answer female who was referred for mental health services for help with symptoms of social anxiety. Based on the patient's report of symptoms, she likely meets criteria for social anxiety disorder and major depressive disorder, moderate, recurrent. She has been in therapy with an outside provider for many years but has come to [facility] specifically seeking cognitive-behavioral therapy (CBT) for social anxiety. The patient's mental health concerns have been affectingher ability to function in interpersonal, occupational, and familial domains and have been causing clinically significant distress. The patient also reports rare  cannabis use (3-4 times per year) and occasional alcohol use (approximately 1x per week, 2 drinks per sitting). The patient is also struggling with unstable job situation which she perceives is highly impacted by her symptoms of social anxiety disorder.  Mariana reports occasional suicidal ideation usually triggered by feeling negatively evaluated by others. She denies intent or paln at this time.  Based on the patient's reported symptoms and impact on functioning, the plan for the patient is outpatient cognitive-behavioral therapy for social anxiety disorder.    DSM-V Diagnoses:  Social anxiety disorder  Major depressive disorder, moderate, recurrent, per record    Orientation, Recommendations, & Plan:       Rights to and limits to confidentiality were discussed with patient.    Integrated care team and shared chart were discussed with patient and agreed upon.    Role as post-doctoral fellow and supervision were discussed with patient.    Patient was given informational handout on postdoctoral fellow status and was invited to ask questions.     Follow-up in 1 to  establish regular psychotherapy to address social anxiety.    Goals for therapy = reduce intensity and frequency of social anxiety symptoms     Obtained ASHWIN for outside therapist Buddy Mcpherson MA at last visit (see care team and media tabs)    Mental Health Screening Questionnaires:    PHQ-9 SCORE 11/14/2017 10/2/2018 11/2/2018   PHQ-9 Total Score 18 11 12     CHARLI-7 SCORE 11/14/2017 10/2/2018 11/2/2018   Total Score 10 10 7     PTSD-PC = 0/4  CAGE AID:  0/4       Current Presenting Problems or Complaints (including patient perception of problem and external factors contributing to current dilemma): ***  Been dealing with social for whole life.   Most bothered by talking to anyone about a need that she has, such as someone in authority for accomodations  Including mom and family members  Fear of being called too sensitive or being ignored, fear of being judged/negatively evaluated.   Fear of being too needy or too sensitive. Has been called that in her life. Has been called this in the past.   People invalidating what I think    Review of Symptoms:  Depression: ***  Hilda: ***  Psychosis: ***  Anxiety: ***  Post Traumatic Stress Disorder: ***    Functioning:  ***    Patient Strengths and Resources: Patient is motivated to improve symptoms     Previous Mental Health Concerns/Treatment:    Outpatient: Previously outpatient     Inpatient: None    Chemical Health History:    Alcohol Use: Not every day, some weekends have 2 drinks per night   Drug Use: Marijuana every six months   Prescription Misuse: None  Tobacco Use: Rarely a cigarette every few months     Have you ever thought about cutting down your drug/alcohol use?  No  Do you ever get annoyed when people ask you about your drug/alcohol use: No  Do you ever feel guilty about your drug/alcohol use: No  Do you ever drink alcohol or use drugs in the morning: No    Patient past attempts to control alcohol/drug use (including informal approaches or formal treatment):  "None  Have there been any consequences related to clients drug use? no.    Mental Status Exam:    Appearance:  {APPEARANCE:957420}  Behavior/Relationship to Examiner/Demeanor:  {:002761}  Build: {BUILD:741792}  Gait:  {NORMAL, ABNORMAL:977361::\"Normal\"}  Psychomotor Activity: {AGITATION / RETARDATION:486474}  Speech rate:  {:829118}  Speech volume:  {:742228}  Speech coherence:  {:325835}  Speech spontaneity:  {:613036}  Mood (subjective report):  {MOOD (SUBJECTIVE REPORT):587497}  Affect (objective appearance):  {:211599}  Eye Contact: {eye contact:042035}  Thought Process (Associations):  {THOUGHT PROCESS (ASSOCIATIONS):566871}  Thought process (Rate):  {:171302}  Abnormal Perception:  {:555513}  Sensorium:  {:986048}  Attention/Concentration:  {:982558}  Insight:  {INSIGHT:327112}  Judgment:  {JUDGMENT:963002}    Suicide Assessment:    Recent suicidal thoughts: {YES (EXPLAIN)/NO:361112}  Past suicidal thoughts: {YES (EXPLAIN)/NO:545003}  Any attempts in the past: {YES (EXPLAIN)/NO:314835}  Any family/friends/loved ones die by suicide: {YES (EXPLAIN)/NO:755094}  Plan or considering various methods: {YES (EXPLAIN)/NO:182897}  Access to guns: {YES (EXPLAIN)/NO:053510}  Protective factors: {Suicide Protective Factors:781082}  Verbal contract for safety: {YES/NO:555606}    Non-Suicidal Self Injurious Behavior: No, but starting to poke self with pen to distract yourself   Violence/Homicide Risk Assessment:     Problems with anger management: No  History of violence: No  History of significant damage to property: No  Threat made to harm or kill someone: No  Verbal contract for safety: No    Safety Plan:   Mariana was provided with the phone number for emergency mental health services and was encouraged to call these local crisis numbers, 911, or visit a local emergency room if thoughts of suicide or homicide were to arise and/or if they were to be in acute distress. The patient agreed to utilize these services as " indicated if the need were to arise. Mariana denied past or current suicidal or homicidal ideation, intent, or plan, denied a history of suicide attempts/self-harming behaviors, and identified *** as  primary protective factor(s) to self-harm or harm to others. Based on these factors, Mariana is considered to be sustainable as an outpatient at this time.     Social History and Associated Level of Functioning (See below):    Current Living Arrangements: Live in an apartment with roommate Wesley lived with him for 1.5 years     Family/Children: Parents live [place] hours [place], brother and sister live here. Middle child.     Intimate Relationship/Marriage: Not currently in relationship, identifies as straight.     Social Connection: Core group of friends    Developmental History: States she had a normal childhood, no developmental problems or educational problems     Abuse/Trauma: Denies    Work: currently working as temp     Education: Difficulty in writing, dropped out of college. Went back and studied bachelor of individualized studies, race and politics, positive peace and social justice studies.     Legal: None    Cultural/Belief System: would be helpful if parents acknowledged being agnostic   Personal Health:     Patient Active Problem List   Diagnosis     Closed fracture of left tibia and fibula with routine healing     Moderate episode of recurrent major depressive disorder (H)     ASCUS with positive high risk HPV cervical     Current Outpatient Prescriptions   Medication     NO ACTIVE MEDICATIONS     venlafaxine (EFFEXOR-XR) 75 MG 24 hr capsule     No current facility-administered medications for this visit.        Family Health History: A lot of family history of depression and anxiety and suicide attempts and death by suicide.  Brother has suicidal thoughts and psychosis. Brother has schizoaffective disorder. Dad's mom  of eating disorder. Many concerns about mental health in family.     NOTE: Diagnostic  "complete   NOTE: Diagnostic incomplete. Will be completed at next visit.    Primary Care PTSD Screen  In your life, have you ever had any experience that was so frightening, horrible or upsetting that, in the past month, you...    1. Have had nightmares about it or thought about it when you did not want to? No  2. Tried hard not to think about it or went out of your way to avoid situations that remind you of it? No  3. Were constantly on guard, watchful, or easily startled? No  4. Felt numb or detached from others, activities, or your surroundings? No    Current research suggests that the results of the PC-PTSD should be considered \"positive\" if a patient answers \"yes\" to any (3) items.    References    RIAZ Tripathi., ILAN Santiago., Kimerling, R., ANJEL Casper., ROCIO Aguilera., ELROY Peters., ALLISON Whitfield, FARA Brewer., Huynh, J.I. (2004). The primary care PTSD screen (PC-PTSD): development and operating characteristics. Primary Care Psychiatry, 9, 9-14.    "

## 2018-11-28 NOTE — MR AVS SNAPSHOT
After Visit Summary   2018    Mariana Mckeon    MRN: [id number]           Patient Information     Date Of Birth          1980        Visit Information        Provider Department      2018 9:00 AM Mariana Collins PhD [facility] MENTAL HEALTH/CHEMICAL DEPENDENCY      Today's Diagnoses     Social anxiety disorder    -  1       Follow-ups after your visit        Your next 10 appointments already scheduled     Dec 05, 2018  8:20 AM CST   Return Visit with PhD Katie Morochos [facility] ([facility])     [address],  [address]  [place] [address]   [phone]            Dec 12, 2018  8:20 AM CST   Return Visit with PhD Katie Morochos [facility] ([facility])     [address] [place] [address],  [place] [address]   [phone]            Dec 19, 2018  8:20 AM CST   Return Visit with PhD Katie Morochos [facility] ([facility])     [address],  [place] [address]   [phone]              Who to contact     Please call your clinic at [phone] to:    Ask questions about your health    Make or cancel appointments    Discuss your medicines    Learn about your test results    Speak to your doctor            Additional Information About Your Visit        MyChart Information     P[employer] is an electronic gateway that provides easy, online access to your medical records. With P[employer], you can request a clinic appointment, read your test results, renew a prescription or communicate with your care team.     To sign up for [employer] visit the website at [email or web address]   You will be asked to enter the access code listed below, as well as some personal information. Please follow the directions to create your username and password.     Your access code is: [email or web address]  Expires: 2018  7:15 AM     Your access code will  in 90  days. If you need help or a new code, please contact your [facility] or call [phone] for assistance.        Care EveryWhere ID     This is your Care EveryWhere ID. This could be used by other organizations to access your [facility] medical records  [id number]         Blood Pressure from Last 3 Encounters:   10/02/18 127/82   11/14/17 117/74   10/17/17 119/72    Weight from Last 3 Encounters:   10/02/18 195 lb 9.6 oz (88.7 kg)   11/14/17 180 lb 9.6 oz (81.9 kg)   10/17/17 184 lb (83.5 kg)              Today, you had the following     No orders found for display       Primary Care Provider Office Phone # Fax #    Katt Cheema [phone]       [address] [facility] [address]        Equal Access to Services     [facility] : Hadii aad ku hadasho Soomaali, waaxda luqadaha, qaybta kaalmada adeegyada, waxay ronniin hayaan radha navas . So [facility] [phone].    ATENCIÓN: Si habla español, tiene a andre disposición servicios gratuitos de asistencia lingüística. David al [phone].    We comply with applicable federal civil rights laws and [place] laws. We do not discriminate on the basis of race, color, national origin, age, disability, sex, sexual orientation, or gender identity.            Thank you!     Thank you for choosing [facility]  for your care. Our goal is always to provide you with excellent care. Hearing back from our patients is one way we can continue to improve our services. Please take a few minutes to complete the written survey that you may receive in the mail after your visit with us. Thank you!             Your Updated Medication List - Protect others around you: Learn how to safely use, store and throw away your medicines at www.disposemymeds.org.          This list is accurate as of 11/28/18 11:59 PM.  Always use your most recent med list.                   Brand Name Dispense Instructions for use  Diagnosis    NO ACTIVE MEDICATIONS      .        venlafaxine 75 MG 24 hr capsule    EFFEXOR-XR    90 capsule    Take 1 capsule (75 mg) by mouth daily    Moderate episode of recurrent major depressive disorder (H)

## 2018-11-29 NOTE — PROGRESS NOTES
Behavioral Health Diagnostic Assessment:    Meeting was: scheduled  Others present: Patient  Meeting lasted: 35 minutes  Client was: on time    Assessment Summary: Diagnostic completed today. The patient is a 38 year old Choose not to answer female who was referred for mental health services for help with symptoms of social anxiety. Based on the patient's report of symptoms, she meets criteria for social anxiety disorder and major depressive disorder, moderate, recurrent. She has been in therapy with an outside provider for many years but has come to [facility] specifically seeking cognitive-behavioral therapy (CBT) for social anxiety. The patient's mental health concerns have been affecting her ability to function in interpersonal, occupational, and familial domains and have been causing clinically significant distress. The patient also reports rare  cannabis use (3-4 times per year) and occasional alcohol use (approximately 1x per week, 2 drinks per sitting). The patient is also struggling with unstable job situation which she perceives is highly impacted by her symptoms of social anxiety disorder.  Mariana reports occasional suicidal ideation usually triggered by feeling negatively evaluated by others. She denies intent or paln at this time.  Based on the patient's reported symptoms and impact on functioning, the plan for the patient is outpatient cognitive-behavioral therapy for social anxiety disorder.    DSM-V Diagnoses:  Social anxiety disorder  Major depressive disorder, moderate, recurrent, per record    Orientation, Recommendations, & Plan:       Rights to and limits to confidentiality were discussed with patient.    Integrated care team and shared chart were discussed with patient and agreed upon.    Role as post-doctoral fellow and supervision were discussed with patient.    Patient was given informational handout on postdoctoral fellow status and was invited to ask questions.     Follow-up in 1 to establish  "regular psychotherapy to address social anxiety.    Goals for therapy = reduce intensity and frequency of social anxiety symptoms     Obtained ASHWIN for outside therapist Buddy Mcpherson MA at last visit (see care team and media tabs)    Asked patient to read chapter 1 in Social Anxiety treatment manual, also complete worksheet on reasons for change/reasons for not changing.     Mental Health Screening Questionnaires:    PHQ-9 SCORE 11/14/2017 10/2/2018 11/2/2018   PHQ-9 Total Score 18 11 12     CHARLI-7 SCORE 11/14/2017 10/2/2018 11/2/2018   Total Score 10 10 7     PTSD-PC = 0/4  CAGE AID:  0/4   Liebowitz Social Anxiety Scale: 56, indicative of moderate social anxiety     Current Presenting Problems or Complaints (including patient perception of problem and external factors contributing to current dilemma): Patient describes longstanding history of social anxiety and depressive symptoms that onset during childhood, states she is unaware of any triggering events. She states that she believes that the social anxiety (fear about certain social situations, fear of being negatively evaluated by others) exacerbates depressive symptoms (feeling lonely, depressed mood, anhedonia) and vice versa. She indicates that her greatest fear is expressing her needs to someone else, including persons in authority (work boss), friends, and family members. She fears that if she expresses her needs to others -even something as simple as where to go out for dinner - other people will call her too sensitive or others will  her. She states she has been called overly sensitive and overly \"needy\" in the past by family members and this was quite hurtful to her. Thus, she avoids expressing needs, even when this negatively impacts her overall well-being and job prospects, in order to avoid the possibility of someone calling her too needy or sensitive.     Review of Symptoms:  Depression: Ongoing for many years, passive suicidal ideation usually " "triggered by criticisms from others, low mood, anhedonia, fatigue-states she believes depression is relatively well managed at this time with medication    Hilda: Denies    Psychosis: Denies-notably has first degree relatively (brother) with schizoaffective disorder    Anxiety: Primarily related to social situations and being evaluated by others, including expressing needs to people in authority, making acquaintances for purposes of romantic/sexual relationship, social gatherings, talking to people she does not know very well. She notes fears that her symptoms of anxiety will be evident to others in social situations.     Post Traumatic Stress Disorder: Denies    Functioning:  Patient notes that social anxiety and depression have impacted her ability to maintain employment due to fear of being evaluated by others. Currently she is working in a temp position and she believes her anxiety about being judged for making performance errors has ended up actually impairing job performance, and she recently found out she was not getting hired on as for a permanent position. Also notes that symptoms impair interpersonal functioning as she tends to avoid social situations with friends    Patient Strengths and Resources: Patient is motivated to improve symptoms, insightful, and has had past success utilizing psychotherapy    Previous Mental Health Concerns/Treatment:    Outpatient: Patient has been seeing Buddy Mcpherson MA for outpatient counseling for the past several years for depressive sxs. She has found this helpful. Believes this therapy was very \"non structured\" and appears interpersonal in nature. Past tx did not focus on social anxiety as much as depression and general life adjustment. Might see occasionally for check-ins during course of care here at [facility]. Will be careful not to duplicate services. ASHWIN on file and Buddy Mcpherson aware of and approves CBT at [facility].     Inpatient: None    Chemical Health " "History:  Alcohol Use: Not every day, some weekends have 2 drinks per night   Drug Use: Marijuana every six months   Prescription Misuse: None  Tobacco Use: Rarely, a cigarette every few months; formerly smoked more regularly.    Have you ever thought about cutting down your drug/alcohol use?  No  Do you ever get annoyed when people ask you about your drug/alcohol use: No  Do you ever feel guilty about your drug/alcohol use: No  Do you ever drink alcohol or use drugs in the morning: No    Patient past attempts to control alcohol/drug use (including informal approaches or formal treatment): None  Have there been any consequences related to clients drug use? no.    Mental Status Exam:    Appearance:  Distressed at times, and Dressed appropriately for weather  Behavior/Relationship to Examiner/Demeanor:  Cooperative, Engaged and Pleasant  Build: medium  Gait:  Normal  Psychomotor Activity: within normal limits   Speech rate:  Normal  Speech volume:  Normal  Speech coherence:  Normal  Speech spontaneity:  Normal  Mood (subjective report):  \"okay\"  Affect (objective appearance):  Appropriate/mood-congruent and Dysphoric, tearful at times  Eye Contact: good  Thought Process (Associations):  Logical, Linear and Goal directed  Thought process (Rate):  Normal  Abnormal Perception:  None  Sensorium:  Alert  Attention/Concentration:  Normal  Insight:  Good  Judgment:  Good    Suicide Assessment:    Recent suicidal thoughts: Yes: describes suicidal thoughts as recently as yesterday after receiving critical feedback from job supervisor. States she typically thinks about \"ways to leave this world without harming my parents.\" She states most recently she thought of leaving the country and pretending to be kidnapped. Did not describe any specific ways of how she would attempt suicide. Denied current intent or plan.    Past suicidal thoughts: Yes: states suicidal ideation has been chronic and ongoing, usually similar content to " above. Denies any preparatory behaviors or intent in the past.   Any attempts in the past: No  Any family/friends/loved ones die by suicide: Yes: states she has several distant relatives (mother's cousins) who have  by suicide, none in last 5 years  Plan or considering various methods: Yes: vague methods as described above  Access to guns: No  Protective factors: no h/o suicide attempt, h/o seeking help when needed, none to minimal alcohol use , commitment to family and stable housing  Verbal contract for safety: Yes    Non-Suicidal Self Injurious Behavior: No, but recently started to poke self with pen to distract self from anxiety. Does not usually hurt.     Violence/Homicide Risk Assessment:  Problems with anger management: No  History of violence: No  History of significant damage to property: No  Threat made to harm or kill someone: No  Verbal contract for safety: No    Safety Plan:   Mariana was provided with the phone number for emergency mental health services and was encouraged to call these local crisis numbers, 911, or visit a local emergency room if thoughts of suicide or homicide were to arise and/or if they were to be in acute distress. The patient agreed to utilize these services as indicated if the need were to arise. Mariana endorsed recent suicidal ideation, but denied suicida  intent, or plan, denied a history of suicide attempts/self-harming behaviors, and identified parents as  primary protective factor(s) to self-harm or harm to others. Based on these factors, Mariana is considered to be sustainable as an outpatient at this time.     Social History and Associated Level of Functioning (See below):    Current Living Arrangements: Live in an apartment with roommate Wesley; she has lived with him for 1.5 years and states it is a good arrangement.    Family/Children: Patient does not have any children. She has one older brother and one younger sister. She is close with parents; [place] [facility], her brother and sister live here. She speaks with family members frequently.    Intimate Relationship/Marriage: Not currently in relationship, identifies as hetersoexual    Social Connection: States she has a core group of friends but spends little time with them. She believes mental health symptoms contribute to lack of social contact but also states that most friends have spouse and children, which makes it difficult to arrange time to spend together,    Developmental History: Born [place] raised by [place]. States she had a normal childhood, no developmental problems or educational problems.    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 Started college immediately after high school but then dropped out, perceives it was due to difficulty in writing papers. She later went back and earned interdisciplinary bachelor's degree in politics/social justice.   Legal: None    Cultural/Belief System: Agnostic, this sometimes causes conflict with parents who are Taoism.     Personal Health:     Patient Active Problem List   Diagnosis     Closed fracture of left tibia and fibula with routine healing     Moderate episode of recurrent major depressive disorder (H)     ASCUS with positive high risk HPV cervical     Current Outpatient Prescriptions   Medication     NO ACTIVE MEDICATIONS     venlafaxine (EFFEXOR-XR) 75 MG 24 hr capsule     No current facility-administered medications for this visit.        Family Health History: Brother- schizoaffective disorder, Paternal grandmother-  by eating disorder, several cousins on mother's sides have attempted suicide, at least one  by suicide. States there is \"lots of other depression and anxiety\" in family.     NOTE: Diagnostic complete     Primary Care PTSD Screen  In your life, have you ever had any experience that was so " "frightening, horrible or upsetting that, in the past month, you...    1. Have had nightmares about it or thought about it when you did not want to? No  2. Tried hard not to think about it or went out of your way to avoid situations that remind you of it? No  3. Were constantly on guard, watchful, or easily startled? No  4. Felt numb or detached from others, activities, or your surroundings? No    Current research suggests that the results of the PC-PTSD should be considered \"positive\" if a patient answers \"yes\" to any (3) items.    References    RIAZ Tripathi., ILAN Santiago., Kimerling, R., ANJEL Casper., ROCIO Aguilera., ELROY Peters., ALLISON Whitfield, FARA Brewer., Huynh, J.I. (2004). The primary care PTSD screen (PC-PTSD): development and operating characteristics. Primary Care Psychiatry, 9, 9-14.    "

## 2018-12-05 ENCOUNTER — TELEPHONE (OUTPATIENT)
Dept: FAMILY MEDICINE | Facility: CLINIC | Age: 38
End: 2018-12-05

## 2018-12-05 NOTE — TELEPHONE ENCOUNTER
This provider placed an outreach call to the patient as she did not show for her scheduled appointment today 12/5/2018. Left a message requesting the patient call the clinic to indicate whether she is interested in keeping appointment for next week, 12/12/2019.     Mariana Collins, PhD

## 2018-12-12 ENCOUNTER — OFFICE VISIT (OUTPATIENT)
Dept: PSYCHOLOGY | Facility: CLINIC | Age: 38
End: 2018-12-12
Payer: COMMERCIAL

## 2018-12-12 DIAGNOSIS — F40.10 SOCIAL ANXIETY DISORDER: Primary | ICD-10-CM

## 2018-12-12 NOTE — PATIENT INSTRUCTIONS
Client's Legal Name: Mariana Mckeon                                    Client's Preferred Name: Mariana   YOB: 1980  Today's Date: November 7, 2018     Date Diagnostic Update Due: 11/30/2019     DSM-V Diagnoses:  Social anxiety disorder  Major depressive disorder, recurrent, moderate     Psychosocial / Contextual Factors:   The patient's mental health concerns have been continuing to affect her ability to function in various domains and have been causing clinically significant distress.     PC-PTSD: 0/4  CAGE AID: 0/4     PHQ-9 SCORE 11/1/2018   Total Score 14      CHARLI-7 SCORE 11/1/2018   Total Score 9         Collaboration: Dr. Cheema     Referral: Anette        Anticipated treatment duration: Approximately 10 weeks  Agreed upon meeting frequency: Weekly     Long Term Treatment Goal(s) related to diagnosis / functional impairment(s)  Goal 1: Mariana will decrease frequency and intensity of social anxiety symptoms     Steps we will take to achieve your goal:                                         Mariana will monitor physiological, behavioral, and cognitive aspects of anxiety   Mariana will gain insight into social anxiety symptoms.   Mariana will develop fear hierarchy.  Mariana will engage in cognitive restructuring     Intervention(s)  Therapist will provide support, psychoeducation and homework assignments as needed.          Help in a Crisis:     COPE ([facility] Mobile Response Team):  [phone]    [facility]al [facility] Center: [phone]    (Emergency Psychiatric Evaluation)     [place] [facility]  24 hour crisis walk-in and crisis line  [address], [place]  [phone]     Crisis Text Line: Text [place] to 076327. Free support at your fingertips 24/7  People who text [place] to 091102 will be connected with a counselor. Crisis Text Line is available 24 hours a day, seven days a week.     If you feel at risk of immediate harm, go directly to the Emergency Department.     Client  has reviewed and agreed to the above plan.     Mariana Addicks, PhD                                November 7, 2018        ______________________________                                   ________  Patient  Signature                                                                                                                                             Date     ______________________________                                                                              ________  Provider Signature                                                                                                                                           Date     ______________________________                                                                    ________  Supervisor Co-Signature                                                                                                                       Date

## 2018-12-12 NOTE — PROGRESS NOTES
"Behavioral Health Progress Note    Client Legal Name: Mariana Mckeon   Client Preferred Name: Mariana   Service Type: Individual  Length of Visit: 45 minutes  Attendees: Patient     Identifying Information and Presenting Problem:    The patient is a 38 year old Choose not to answer female who is being seen for problematic symptoms of social anxiety disorder.    Treatment Objective(s) Addressed in This Session:  Anxiety: will experience a reduction in anxiety, will develop more effective coping skills to manage anxiety symptoms, will develop healthy cognitive patterns and beliefs and will increase ability to function adaptively    Progress on / Status of Treatment Objective(s) / Homework:  Achieved / completed to satisfaction    PHQ-9 SCORE 11/14/2017 10/2/2018 11/2/2018   PHQ-9 Total Score 18 11 12       CHARLI-7 SCORE 11/14/2017 10/2/2018 11/2/2018   Total Score 10 10 7       Topics Discussed/Interventions Provided:  -Established treatment plan (see AVS)    -Reviewed homework from last visit. Patient completed Worksheet 1.1 \"Pros and Cons of Working on My Social Anxiety.\" See media tab.She clearly put a lot of thought into this activity. Her main reasons for wanting to change include being able to stick up for herself without \"feeling attacked,\"; feeling more respected in relationships, and consistently hold job. Reasons for not changing included \"avoid acute anxiety\" and \"stay in bed all day.\"     -Reviewed  \"three systems\" of anxiety. Provided psychoeducation on physiological, cognitive, and behavioral aspects of social anxiety. Using example scenario described in treatment manual, encouraged Mariana to think about how someone else would feel in a stressful situation. She has a lot of insight into how social anxiety manifests. Also had Mariana describe recent social anxiety event with her boss giving her feedback. Mariana described recent event in which she felt socially anxious and described physical symptoms (blushing, " "depersonalization/derealization, lump in throat). She states depersonalization/drealization is most disconcerting/distressing to her. Also reviewed cognitive symptoms she experiences, \"my boss will think I'm crazy,\" \"my boss will give me a bad reference.\" Assisted Mariana in challenging the truthfulness of these thoughts. She did well at challenging the thoughts. Discussed that some thoughts can be true but not helpful. Mariana emphasizes she feels fatigued due to managing physical and cognitive symptoms of social anxiety. Mariana agrees to read about behavioral symptoms of social anxiety as homework and to complete one example of how all three systems interact with each other based on an anxiety-provoking experience in next week (worksheet).     Assessment: The patient appeared to be active and engaged in today's session and was receptive to feedback.     Mental Status: Mariana appeared generally alert and oriented. Dress was casual and appropriate to the weather and occasion. Grooming and hygiene were good. Eye contact was good. Speech was of normal volume and rate and was clear, coherent, and relevant. Mood was euthymic with congruent affect. Thought processes were relevant, logical and goal-directed. Thought content was WNL with no evidence of psychotic or paranoid features. Denied SI/HI or self-harm, intent, or plans. Memory appeared grossly intact. Insight and judgment appeared intact and patient exhibited good impulse control during the appointment.     Does the patient appear to be at imminent risk of harm to self/others at this time? No    The session was necessary to address symptoms of social anxiety that have been interfering with patient's ability to function in vocational and interpersonal settings.  Ongoing psychotherapy is necessary to provide counseling, improve functioning with daily activities and provide psychoeducation.    Diagnosis (DSM-5):  Social anxiety disorder  Major depressive disorder, moderate, " recurrent    Plan:  1. Follow up in 1 week.  2. Work on homework.  3. Utilize crisis resources as needed.      NOTE: Treatment plan update due 3/12/2019.  Diagnostic assessment update due 12/1/2019.

## 2018-12-19 ENCOUNTER — OFFICE VISIT (OUTPATIENT)
Dept: PSYCHOLOGY | Facility: CLINIC | Age: 38
End: 2018-12-19
Payer: COMMERCIAL

## 2018-12-19 DIAGNOSIS — F40.10 SOCIAL ANXIETY DISORDER: Primary | ICD-10-CM

## 2018-12-19 NOTE — PROGRESS NOTES
"Behavioral Health Progress Note     Client Legal Name:   Mariana Mckeon    Client Preferred Name: Mariana            Service Type: Individual  Length of Visit: 40 minutes  Attendees: Patient      Identifying Information and Presenting Problem:  The patient is a 38 year old Choose not to answer female who is being seen for problematic symptoms of social anxiety disorder.     Treatment Objective(s) Addressed in This Session:  Anxiety: will experience a reduction in anxiety, will develop more effective coping skills to manage anxiety symptoms, will develop healthy cognitive patterns and beliefs and will increase ability to function adaptively     Progress on / Status of Treatment Objective(s) / Homework:  Achieved / completed to satisfaction    PHQ-9 SCORE 11/14/2017 10/2/2018 11/2/2018   PHQ-9 Total Score 18 11 12       CHARLI-7 SCORE 11/14/2017 10/2/2018 11/2/2018   Total Score 10 10 7       Topics Discussed/Interventions Provided:  -Clarified treatment goals: Reflected to patient that after reviewing her worksheet from last week, wanted to clarify some aspects of treatment goals for social anxiety. Discussed increasing comfort with social situations versus tolerating situations better and improving social/overall functioning. She states understanding.    -Reviewed three systems of anxiety: As Mariana described some of the recent events from this week in her life that evoked social anxiety, encouraged her to identify the three components of anxiety she experienced (physiological, behavioral, cognitive). She did well with this.      -\"Real-life\" exposure: Mariana described having another recent conversation with her supervisor that required her to be assertive and also appropriately accept criticism. There was a substantial amount of money missing from her cash drawer at work and she is not sure why, perhaps because she is anxious while doing her job so she does not pay attention. She is not sure, could also have been stolen by " "a co-worker. She at first said she did not think she did a \"good job\" with the conversation and tolerating the stress because she cried afterwards. However, after discussing it further, she began to identify ways she showed a great deal of distress tolerance and positive coping . Provided her with praise and encouragement, explaining to her that that seemed like it was at the top of her \"fear hierarchy\" (which we will establish at later session). She agrees. Discussed how crying is not \"bad\" in and of itself. She also expressed having passive suicidal ideation after this conversation, which is quite distressing to her. Will work towards identifying these thoughts as automatic and replacing them with healthier cognitions.     -Job situation: After next week, Mariana will be without a permanent job. 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 She is interested in developing skills in therapy to tolerate social anxiety especially in the context of job search/new job.     Assessment: The patient appeared to be active and engaged in today's session and was receptive to feedback.     Mental Status: Mariana appeared generally alert and oriented. Dress was casual and appropriate to the weather and occasion. Grooming and hygiene were good. Eye contact was good. Speech was of normal volume and rate and was clear, coherent, and relevant. Mood was euthymic with congruent affect. Thought processes were relevant, logical and goal-directed. Thought content was WNL with no evidence of psychotic or paranoid features. Denied current SI/HI or self-harm, intent, or plans. Noted passive suicidal ideation after stressful situation this week, but denied ever having plan or intent.  Memory appeared grossly intact. Insight and judgment appeared intact and " patient exhibited good impulse control during the appointment.     Does the patient appear to be at imminent risk of harm to self/others at this time? No     The session was necessary to address symptoms of social anxiety that have been interfering with patient's ability to function in vocational and interpersonal settings.  Ongoing psychotherapy is necessary to provide counseling, improve functioning with daily activities and provide psychoeducation.    Diagnosis (DSM-5):  Social anxiety disorder  Major depressive disorder, moderate, recurrent    Plan:  1. Follow up in 2  weeks.  2. Patient to read ch 3 (provided copies) of Social Anxiety workbook. She will brainstorm fear hierarchy.   3. Utilize crisis resources as needed.       NOTE: Treatment plan update due 3/12/2019.  Diagnostic assessment update due 12/1/2019.

## 2019-01-09 ENCOUNTER — OFFICE VISIT (OUTPATIENT)
Dept: PSYCHOLOGY | Facility: CLINIC | Age: 39
End: 2019-01-09
Payer: COMMERCIAL

## 2019-01-09 DIAGNOSIS — F33.1 MODERATE EPISODE OF RECURRENT MAJOR DEPRESSIVE DISORDER (H): ICD-10-CM

## 2019-01-09 DIAGNOSIS — F40.10 SOCIAL ANXIETY DISORDER: Primary | ICD-10-CM

## 2019-01-09 NOTE — PATIENT INSTRUCTIONS
"Choose one situation on your hierarchy and engage in exposure   -Record SUDS before, during, and after the exposure (if assigning a number is hard, jot down symptoms you are having)   -Also record \"automatic negative thoughts\" before, during and after as well.     "

## 2019-01-09 NOTE — PROGRESS NOTES
"Behavioral Health Progress Note    Client Legal Name: Mariana Mckeon   Client Preferred Name: Mariana   Service Type: Individual  Length of Visit: 55 minutes  Attendees: Patient     Identifying Information and Presenting Problem:  The patient is a 38 year old Choose not to answer female who is being seen for problematic symptoms of social anxiety disorder.     Treatment Objective(s) Addressed in This Session:  Anxiety: will experience a reduction in anxiety, will develop more effective coping skills to manage anxiety symptoms, will develop healthy cognitive patterns and beliefs and will increase ability to function adaptively     Progress on / Status of Treatment Objective(s) / Homework:  Achieved / completed to satisfaction    PHQ-9 SCORE 11/14/2017 10/2/2018 11/2/2018   PHQ-9 Total Score 18 11 12       CHARLI-7 SCORE 11/14/2017 10/2/2018 11/2/2018   Total Score 10 10 7       Topics Discussed/Interventions Provided:  -Update on symptoms: Patient notes mood is \"stable\". She has been having difficulty motivating herself to apply for new jobs. 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 Has not had much social anxiety, though she acknowledges that she has been avoiding most social interactions other than work.     -Reviewed fear and avoidance hierarchy (scanned): Patient completed this worksheet. She did a great job identifying feared situations, and corresponding subjective units of distress (SUDS) and avoidance level. She had some questions about general nature of fear/avoidance which we discussed openly. As we reviewed this, she wonders if they are \"ordered correctly,\" and she may edit the order of the hierarchy. Praised patient for completing this assignment. While discussing this, patient also noted instance with a co-worker at [employer] that she feels she wants to address (co-worker is rude to front of the house staff) but worries that she will be too anxious or \"say the wrong thing.\" Patient with " "good understanding of effective \"I statements\" but often avoids confronting others due to social anxiety. Problem-solved together; this situation matches up well with one of the situations on hierarchy, but she is not sure she wants to tackle this just yet.     -Introduced exposure: Reviewed purpose of exposure. Encouraged patient to select one item on hierarchy and challenge with exposure this week. She reports low confidence but high motivation to do this. She was unable to choose which situation she wanted to tackle at this time. Provided education on how to record SUDS and automatic negative thoughts for exposures (see AVS).     Assessment: The patient appeared to be active and engaged in today's session and was receptive to feedback.     Mental Status: Mariana appeared generally alert and oriented. Dress was casual and appropriate to the weather and occasion. Grooming and hygiene were good. Eye contact was good. Speech was of normal volume and rate and was clear, coherent, and relevant. Mood was euthymic with congruent affect. Thought processes were relevant, logical and goal-directed. Thought content was WNL with no evidence of psychotic or paranoid features. Denied current SI/HI or self-harm, intent, or plans.  Memory appeared grossly intact. Insight and judgment appeared intact and patient exhibited good impulse control during the appointment.      Does the patient appear to be at imminent risk of harm to self/others at this time? No     The session was necessary to address symptoms of social anxiety that have been interfering with patient's ability to function in vocational and interpersonal settings.  Ongoing psychotherapy is necessary to provide counseling, improve functioning with daily activities and provide psychoeducation.     Diagnosis (DSM-5):  Social anxiety disorder  Major depressive disorder, moderate, recurrent     Plan:  1. Follow up in 1  weeks.  2. Exposure homework (see AVS).   3. Utilize " crisis resources as needed.        NOTE: Treatment plan update due 3/12/2019.  Diagnostic assessment update due 12/1/2019.

## 2019-01-25 ENCOUNTER — OFFICE VISIT (OUTPATIENT)
Dept: PSYCHOLOGY | Facility: CLINIC | Age: 39
End: 2019-01-25
Payer: COMMERCIAL

## 2019-01-25 DIAGNOSIS — F40.10 SOCIAL ANXIETY DISORDER: Primary | ICD-10-CM

## 2019-01-25 DIAGNOSIS — F33.1 MODERATE EPISODE OF RECURRENT MAJOR DEPRESSIVE DISORDER (H): ICD-10-CM

## 2019-01-25 NOTE — PROGRESS NOTES
"Behavioral Health Progress Note    Client Legal Name: Mariana Mckeon   Client Preferred Name: Mariana   Service Type: Individual  Length of Visit: 40 minutes  Attendees: Patient    Identifying Information and Presenting Problem:  The patient is a 38 year old Choose not to answer female who is being seen for problematic symptoms of social anxiety disorder.     Treatment Objective(s) Addressed in This Session:  Anxiety: will experience a reduction in anxiety, will develop more effective coping skills to manage anxiety symptoms, will develop healthy cognitive patterns and beliefs and will increase ability to function adaptively  Depression: will identify and challenge negative thoughts about self, world, and future    Progress on / Status of Treatment Objective(s) / Homework:  Achieved / completed to satisfaction    PHQ-9 SCORE 11/14/2017 10/2/2018 11/2/2018   PHQ-9 Total Score 18 11 12       CHARLI-7 SCORE 11/14/2017 10/2/2018 11/2/2018   Total Score 10 10 7       Topics Discussed/Interventions Provided:  -Feared situation exposure: Mariana completed homework of facing feared event on hierarchy - having assertive communication. She stood up for herself at work with one of the . States she is proud of how she handled it but disappointed that the conflict was not completely resolved and the  was rude to her in response. Described SUDS starting at 15, raising to 80 at peak of conflict, then going back down. She feels \"okay\" about it now, but worried about going back to work to face him. Praised her for engaging in this exposure. She will do another this week (she did not decide which one) and try to write down SUDS ratings this time.     -Challenging negative thoughts about self: Mariana has revised her resume to apply for jobs. She feels \"guilty\" at times writing on resume about her positive qualities. She feels very negative about herself. With support, she was able to catch, check, and change some of these thoughts. " "Reflected that it will feel uncomfortable at first to change thoughts as she has had these negative cognitions for so long.     -Reframing \"highly sensitive\": Patient perceives self to be highly sensitive and believes this to be a \"bad thing.\" Assisted patient in reframing this belief by considering the positive aspects of being sensitive - good friend, perceptive, good at customer service. Shared quote about the strength of sensitivity by Bruno Quintero. Patient relates well to this.     Assessment: The patient appeared to be active and engaged in today's session and was receptive to feedback.     Mental Status: Mariana appeared generally alert and oriented. Dress was casual and appropriate to the weather and occasion. Grooming and hygiene were good. Eye contact was good. Speech was of normal volume and rate and was clear, coherent, and relevant. Mood was euthymic with congruent affect. Thought processes were relevant, logical and goal-directed. Thought content was WNL with no evidence of psychotic or paranoid features. Denied current SI/HI or self-harm, intent, or plans.  Memory appeared grossly intact. Insight and judgment appeared intact and patient exhibited good impulse control during the appointment.      Does the patient appear to be at imminent risk of harm to self/others at this time? No     The session was necessary to address symptoms of social anxiety that have been interfering with patient's ability to function in vocational and interpersonal settings.    Ongoing psychotherapy is necessary to provide counseling, improve functioning with daily activities and provide psychoeducation.     Diagnosis (DSM-5):  Social anxiety disorder  Major depressive disorder, moderate, recurrent    Plan:  1. Follow up in 2 weeks (does not appear to have scheduled - will check in in one week   2. Work on goals as noted in patient instructions.  3. Utilize crisis resources as needed.      NOTE: Treatment plan update " due 3/12/2019.  Diagnostic assessment update due 12/1/2020.

## 2019-04-10 ENCOUNTER — OFFICE VISIT (OUTPATIENT)
Dept: PSYCHOLOGY | Facility: CLINIC | Age: 39
End: 2019-04-10
Payer: COMMERCIAL

## 2019-04-10 DIAGNOSIS — F33.1 MODERATE EPISODE OF RECURRENT MAJOR DEPRESSIVE DISORDER (H): ICD-10-CM

## 2019-04-10 LAB
% GRANULOCYTES: 70.6 %G (ref 40–75)
ALBUMIN SERPL-MCNC: 4.4 MG/DL (ref 3.8–5)
ALP SERPL-CCNC: 48.6 U/L (ref 31.7–110.5)
ALT SERPL-CCNC: 12.7 U/L (ref 0–45)
AST SERPL-CCNC: 14.5 U/L (ref 0–45)
BILIRUB SERPL-MCNC: 0.5 MG/DL (ref 0.2–1.3)
BUN SERPL-MCNC: 10 MG/DL (ref 7–19)
CALCIUM SERPL-MCNC: 10.3 MG/DL (ref 8.5–10.1)
CHLORIDE SERPLBLD-SCNC: 104.5 MMOL/L (ref 98–110)
CO2 SERPL-SCNC: 25.5 MMOL/L (ref 20–32)
CREAT SERPL-MCNC: 0.8 MG/DL (ref 0.5–1)
FOLATE SERPL-MCNC: 17 NG/ML
GFR SERPL CREATININE-BSD FRML MDRD: 84.4 ML/MIN/1.7 M2
GLUCOSE SERPL-MCNC: 92.1 MG'DL (ref 70–99)
GRANULOCYTES #: 4.7 K/UL (ref 1.6–8.3)
HCT VFR BLD AUTO: 45.1 % (ref 35–47)
HEMOGLOBIN: 14.2 G/DL (ref 11.7–15.7)
LYMPHOCYTES # BLD AUTO: 1.6 K/UL (ref 0.8–5.3)
LYMPHOCYTES NFR BLD AUTO: 23.2 %L (ref 20–48)
MCH RBC QN AUTO: 28.9 PG (ref 26.5–35)
MCHC RBC AUTO-ENTMCNC: 31.5 G/DL (ref 32–36)
MCV RBC AUTO: 91.7 FL (ref 78–100)
MID #: 0.4 K/UL (ref 0–2.2)
MID %: 6.2 %M (ref 0–20)
PLATELET # BLD AUTO: 350 K/UL (ref 150–450)
POTASSIUM SERPL-SCNC: 4.3 MMOL/DL (ref 3.3–4.5)
PROT SERPL-MCNC: 6.6 G/DL (ref 6.8–8.8)
RBC # BLD AUTO: 4.92 M/UL (ref 3.8–5.2)
SODIUM SERPL-SCNC: 136.8 MMOL/L (ref 132.6–141.4)
TSH SERPL DL<=0.005 MIU/L-ACNC: 1.26 MU/L (ref 0.4–4)
VIT B12 SERPL-MCNC: 198 PG/ML (ref 193–986)
WBC # BLD AUTO: 6.7 K/UL (ref 4–11)

## 2019-04-10 RX ORDER — VENLAFAXINE HYDROCHLORIDE 37.5 MG/1
37.5 CAPSULE, EXTENDED RELEASE ORAL DAILY
Qty: 30 CAPSULE | Refills: 0 | Status: SHIPPED | OUTPATIENT
Start: 2019-04-10 | End: 2019-06-24

## 2019-04-10 RX ORDER — VENLAFAXINE HYDROCHLORIDE 75 MG/1
75 CAPSULE, EXTENDED RELEASE ORAL DAILY
Qty: 30 CAPSULE | Refills: 0 | Status: SHIPPED | OUTPATIENT
Start: 2019-04-10 | End: 2019-05-06

## 2019-04-10 NOTE — PATIENT INSTRUCTIONS
- Follow-up with psychiatry in 2-4 weeks. Phone number is [phone].  - See Dr. GREGORY in 2 weeks  - Start VLFX 112.5mg daily    Call primary care if questions or concerns.
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
## 2019-04-11 LAB — DEPRECATED CALCIDIOL+CALCIFEROL SERPL-MC: 11 UG/L (ref 20–75)

## 2019-04-23 ENCOUNTER — OFFICE VISIT (OUTPATIENT)
Dept: PSYCHIATRY | Facility: CLINIC | Age: 39
End: 2019-04-23
Attending: NURSE PRACTITIONER
Payer: COMMERCIAL

## 2019-04-23 VITALS — SYSTOLIC BLOOD PRESSURE: 117 MMHG | HEART RATE: 110 BPM | DIASTOLIC BLOOD PRESSURE: 73 MMHG

## 2019-04-23 DIAGNOSIS — F33.1 MODERATE EPISODE OF RECURRENT MAJOR DEPRESSIVE DISORDER (H): Primary | ICD-10-CM

## 2019-04-23 PROCEDURE — G0463 HOSPITAL OUTPT CLINIC VISIT: HCPCS | Mod: ZF

## 2019-04-23 ASSESSMENT — PAIN SCALES - GENERAL: PAINLEVEL: NO PAIN (0)

## 2019-04-23 ASSESSMENT — PATIENT HEALTH QUESTIONNAIRE - PHQ9: SUM OF ALL RESPONSES TO PHQ QUESTIONS 1-9: 16
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
## 2019-05-06 ENCOUNTER — OFFICE VISIT (OUTPATIENT)
Dept: PSYCHIATRY | Facility: CLINIC | Age: 39
End: 2019-05-06
Attending: NURSE PRACTITIONER
Payer: COMMERCIAL

## 2019-05-06 VITALS — SYSTOLIC BLOOD PRESSURE: 130 MMHG | HEART RATE: 74 BPM | DIASTOLIC BLOOD PRESSURE: 83 MMHG

## 2019-05-06 DIAGNOSIS — F33.1 MODERATE EPISODE OF RECURRENT MAJOR DEPRESSIVE DISORDER (H): ICD-10-CM

## 2019-05-06 PROCEDURE — G0463 HOSPITAL OUTPT CLINIC VISIT: HCPCS | Mod: ZF

## 2019-05-06 RX ORDER — VENLAFAXINE HYDROCHLORIDE 150 MG/1
CAPSULE, EXTENDED RELEASE ORAL
Qty: 30 CAPSULE | Refills: 0 | Status: SHIPPED | OUTPATIENT
Start: 2019-05-06 | End: 2019-05-30

## 2019-05-06 ASSESSMENT — PATIENT HEALTH QUESTIONNAIRE - PHQ9: SUM OF ALL RESPONSES TO PHQ QUESTIONS 1-9: 12

## 2019-05-06 ASSESSMENT — PAIN SCALES - GENERAL: PAINLEVEL: NO PAIN (0)

## 2019-05-06 NOTE — NURSING NOTE
Chief Complaint   Patient presents with     Recheck Medication     MDD     Patient declined weight today.Confusion Assessment Method - ICU
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
## 2019-05-30 DIAGNOSIS — F33.1 MODERATE EPISODE OF RECURRENT MAJOR DEPRESSIVE DISORDER (H): ICD-10-CM

## 2019-05-30 RX ORDER — VENLAFAXINE HYDROCHLORIDE 150 MG/1
CAPSULE, EXTENDED RELEASE ORAL
Qty: 30 CAPSULE | Refills: 0 | Status: SHIPPED | OUTPATIENT
Start: 2019-05-30 | End: 2019-06-24

## 2019-05-30 NOTE — TELEPHONE ENCOUNTER
[facility] Call Center    Phone Message    May a detailed message be left on voicemail: yes    Reason for Call: Medication Refill Request    Has the patient contacted the pharmacy for the refill? Yes   Name of medication being requested: Venlafaxine 150 MG 24 Hour Capsule   Provider who prescribed the medication: Adamaris Hester   Pharmacy: [facility] [id number] [place], [place] - [facility]  Date medication is needed: Patient had to reschedule appointment slated for tomorrow due to a conflicting appointment.          Action Taken: Message routed to:  Other: Jordyn Bedoya

## 2019-05-30 NOTE — TELEPHONE ENCOUNTER
Meds refilled by provider   Med tab changed to reflect this   Patient notified of the refill     No further action needed by this writer

## 2019-05-30 NOTE — TELEPHONE ENCOUNTER
Last seen: 5/6/19  RTC: 4 weeks   Cancel: 5/31/19  No-show: none   Next appt: 6/24/19    Incoming refill from patient via phone     Medication requested: venlafaxine (EFFEXOR-XR) 150 MG 24 hr capsule  Directions: Take one capsule daily  Qty: 30  Last refilled: 5/6/19    Will route to provider for approval

## 2019-06-24 ENCOUNTER — OFFICE VISIT (OUTPATIENT)
Dept: PSYCHIATRY | Facility: CLINIC | Age: 39
End: 2019-06-24
Attending: NURSE PRACTITIONER
Payer: COMMERCIAL

## 2019-06-24 VITALS — DIASTOLIC BLOOD PRESSURE: 88 MMHG | HEART RATE: 84 BPM | SYSTOLIC BLOOD PRESSURE: 130 MMHG

## 2019-06-24 DIAGNOSIS — F33.1 MODERATE EPISODE OF RECURRENT MAJOR DEPRESSIVE DISORDER (H): ICD-10-CM

## 2019-06-24 PROCEDURE — G0463 HOSPITAL OUTPT CLINIC VISIT: HCPCS | Mod: ZF

## 2019-06-24 RX ORDER — VENLAFAXINE HYDROCHLORIDE 150 MG/1
CAPSULE, EXTENDED RELEASE ORAL
Qty: 30 CAPSULE | Refills: 2 | Status: SHIPPED | OUTPATIENT
Start: 2019-06-24 | End: 2019-09-30

## 2019-06-24 ASSESSMENT — PAIN SCALES - GENERAL: PAINLEVEL: NO PAIN (0)
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
## 2019-06-25 ASSESSMENT — PATIENT HEALTH QUESTIONNAIRE - PHQ9: SUM OF ALL RESPONSES TO PHQ QUESTIONS 1-9: 5

## 2019-08-28 NOTE — TELEPHONE ENCOUNTER
FW: rubens jones refill request   Received: Today   Message Contents   Jordyn Bedoya, Jordyn Foley, RN   Phone Number: [phone]          Previous Messages      ----- Message -----   From: Tushar Siegel   Sent: 8/28/2019  12:50 PM   To: [facility]   Subject: rubens jones refill request                         Caller:  Mariana   Relationship to pt: self   Medication: venlafaxine (EFFEXOR-XR) 150 MG 24 hr capsule   How many days left of med do you have left (if >3 day supply, redirect to pharmacy): Patient unsure, did not have bottle in front of her just knows it is getting low. Reported that she may have a refill waiting already and is unsure at this time   Pharmacy and location: [facility] [id number] [place] [address]   Pending appt date:  N/A   Okay to leave detailed VM:  Yes      Last seen: 6/24/19  RTC: 12 weeks   Cancel: none   No-show: none   Next appt: none     Incoming refill from patient via phone     Medication requested: venlafaxine (EFFEXOR-XR) 150 MG 24 hr capsule  Directions: Take one capsule daily  Qty: 30  Last refilled: 6/4/19    Post reviewing the chart patient has refills at the pharmacy. Placed a call to pharmacy and confirmed the refills. Patient notified.     No further action needed by this writer

## 2019-09-30 ENCOUNTER — TELEPHONE (OUTPATIENT)
Dept: PSYCHIATRY | Facility: CLINIC | Age: 39
End: 2019-09-30

## 2019-09-30 DIAGNOSIS — F33.1 MODERATE EPISODE OF RECURRENT MAJOR DEPRESSIVE DISORDER (H): ICD-10-CM

## 2019-10-02 RX ORDER — VENLAFAXINE HYDROCHLORIDE 150 MG/1
CAPSULE, EXTENDED RELEASE ORAL
Qty: 30 CAPSULE | Refills: 0 | Status: SHIPPED | OUTPATIENT
Start: 2019-10-02 | End: 2019-10-03

## 2019-10-02 NOTE — TELEPHONE ENCOUNTER
Medication requested:  VENLAFAXINE HCL  MG CAPTake one capsule daily  Last refilled: 6/24/2019  Qty: 30/ 2      Last seen: 6/24/2019  RTC: 12 WEEKS  Cancel: 0  No-show: 0  Next appt: 0    Refill decision:   Refilled for 30 days per protocol.  Scheduling has been notified to contact the pt for appointment.

## 2019-10-03 ENCOUNTER — TELEPHONE (OUTPATIENT)
Dept: PSYCHIATRY | Facility: CLINIC | Age: 39
End: 2019-10-03

## 2019-10-03 DIAGNOSIS — F33.1 MODERATE EPISODE OF RECURRENT MAJOR DEPRESSIVE DISORDER (H): ICD-10-CM

## 2019-10-03 RX ORDER — VENLAFAXINE HYDROCHLORIDE 150 MG/1
CAPSULE, EXTENDED RELEASE ORAL
Qty: 90 CAPSULE | Refills: 0 | Status: SHIPPED | OUTPATIENT
Start: 2019-10-03 | End: 2020-01-08

## 2019-10-03 NOTE — TELEPHONE ENCOUNTER
Medication Refill     Adamaris Hester, APRN CNP  You 16 hours ago (4:30 PM)      I'm OK with 3 months but no further RFs    Routing comment

## 2019-10-03 NOTE — TELEPHONE ENCOUNTER
Medication Refill     Adamaris Hester APRN CNP  You 16 hours ago (4:30 PM)      I'm OK with 3 months but no further RFs    Routing comment       You  Adamaris Hester APRN CNP 19 hours ago (1:01 PM)      Aram Bailon,     What are your thoughts on Noemy's response?     Thanks,   Jordyn MARKS     Routing comment       Noemy Mohan  [place] 19 hours ago (12:42 PM)      I called this pt and she said that her insurance has changed so she can't schedule an appointment, but she'd like her venlefaxine renewed for another three months if Adamaris can do that. I assumed you'd want to look into that before giving her a call back.    Routing comment      Last seen: 6/24/19  RTC: 12 weeks   Cancel: none   No-show: none   Next appt: none     Incoming refill from patient via phone     Medication requested: venlafaxine (EFFEXOR-XR) 150 MG 24 hr capsule  Directions: TAKE 1 CAPSULE BY MOUTH EVERY DAY APPOINTMENT DUE  Qty: 30  Last refilled: 10/2/19    Meds refilled for another 3 months per providers approval   Med tab changed to reflect this   Patient notified     No further action needed by this writer

## 2019-11-28 DIAGNOSIS — F33.1 MODERATE EPISODE OF RECURRENT MAJOR DEPRESSIVE DISORDER (H): ICD-10-CM

## 2019-11-29 RX ORDER — VENLAFAXINE HYDROCHLORIDE 150 MG/1
CAPSULE, EXTENDED RELEASE ORAL
Qty: 30 CAPSULE | Refills: 2 | OUTPATIENT
Start: 2019-11-29

## 2019-12-30 DIAGNOSIS — F33.1 MODERATE EPISODE OF RECURRENT MAJOR DEPRESSIVE DISORDER (H): ICD-10-CM

## 2020-01-04 NOTE — TELEPHONE ENCOUNTER
"Medication requested: VENLAFAXINE HCL  MG CAP   Last refilled: 10/3/19  Qty: 90/0      Last seen: 6/24/19  RTC: 12 weeks  Cancel: 0  No-show: 0  Next appt: none    Refill decision:Refill pended and routed to the provider for review/determination due to outside of  timeframe for refills and last note 10/3/2019 \"I'm OK with 3 months but no further RFs \"         "

## 2020-01-06 RX ORDER — VENLAFAXINE HYDROCHLORIDE 150 MG/1
CAPSULE, EXTENDED RELEASE ORAL
Qty: 30 CAPSULE | OUTPATIENT
Start: 2020-01-06

## 2020-01-06 NOTE — TELEPHONE ENCOUNTER
Writer placed a call to patient at [phone] to update regarding a refill. No answer at number provided. LVM, requesting a call back. Clinic number provided.

## 2020-01-06 NOTE — TELEPHONE ENCOUNTER
"Writer received incoming call from patient ([phone]) returning this writers call. Patient reports that she does not have active insurance at this time. She is unsure when the insurance will be activated. Also mentioned \" I am not able to tell you when I will be getting insurance since I do not have a stable job at this time and therefore I am not able to schedule an appt\" Patient reports having 3 days of supply of Venlafaxine 150 mg. She has been paying for the mediation out of pocket. Writer informed patient to contact billing to confirm the cost of office visit without insurance. She agreed with the plan. Writer agreed to reach out to provider for further recommendations.   "

## 2020-01-08 ENCOUNTER — TELEPHONE (OUTPATIENT)
Dept: PSYCHIATRY | Facility: CLINIC | Age: 40
End: 2020-01-08

## 2020-01-08 DIAGNOSIS — F33.1 MODERATE EPISODE OF RECURRENT MAJOR DEPRESSIVE DISORDER (H): ICD-10-CM

## 2020-01-08 RX ORDER — VENLAFAXINE HYDROCHLORIDE 150 MG/1
CAPSULE, EXTENDED RELEASE ORAL
Qty: 30 CAPSULE | Refills: 0 | Status: SHIPPED | OUTPATIENT
Start: 2020-01-08 | End: 2020-02-03

## 2020-01-08 NOTE — TELEPHONE ENCOUNTER
Social Work   Incoming/Outgoing Call  [facility]    Outgoing Call To: Mariana Mckeon    Reason for Call:  Patient requesting refill on Venlafaxine. Not seen in clinic since 6/2019. SW involvement occurring due to no health insurance.    Response/Plan:  SW checked with financial team and verified insurance not currently active. Mariana reports that it is very hard to estimate monthly income as she works in the [employer] economy. Her current job is scheduled to last about 2 months. She also has had multiple insurances over the years and it can be hard to track.     SW checked on monthly income. Based on Mariana's responses, she will vary between qualifying for medical assistance and [place]. SW reviewed that MnCare premiums would likely be around $20 (but this is just an estimate) and encouraged her to apply for insurance. SW reviewed that if she applies through [employer] and does not qualify, writer can also assist in applying for [facility] to cover costs of provider visits.    Patient appeared to request phone check in with provider rather than in person meeting. SW reviewed importance of checking in face to face (or telemedicine, which also incurs fee) to assess symptoms, med efficacy, side effects, vitals, etc. Patient expressed understanding.    SW checked in with provider, Adamaris Hester. She approved plan to prescribe 30 days worth of meds as long as patient made appointment within 30 days. If patient does not make it to scheduled appointment to be assessed in person, Adamaris will not prescribe additional days.    SABINA called Mariana second time and relayed plan. Adamaris Hester filled venlafaxine for 30 days. Mariana scheduled appointment for 2/3/20 at 2:30. SABINA reviewed that prescriber will not be able to prescribe any additional medication until patient is seen. Mariana expressed understanding. SW will also check in during visit to check on insurance needs and assist with follow up.        Will route to patient's current psychiatric  provider(s) as an FYI.   Please call or EPIC message with any questions or concerns.    SHANA Montanez, LICSW  [phone]

## 2020-01-08 NOTE — TELEPHONE ENCOUNTER
- eds refilled by provider in above encounter   - Patient notified by Bonnie MUHAMMAD     - No further action needed by this writer

## 2020-01-08 NOTE — TELEPHONE ENCOUNTER
Writer received incoming call from patient wanting an update regarding refill request for Venlafaxine. Writer updated patient that, provider has not responded to this writer yet. Agreed to call the patient back with an update.

## 2020-01-31 ENCOUNTER — TELEPHONE (OUTPATIENT)
Dept: PSYCHIATRY | Facility: CLINIC | Age: 40
End: 2020-01-31

## 2020-01-31 NOTE — TELEPHONE ENCOUNTER
Social Work   Incoming/Outgoing Call  [facility]    Outgoing Call To: Mariana Mckeon    Reason for Call:  SW following up on health insurance    Response/Plan:  Mariana applied and received paperwork that she is eligible as long as she sends in premium in time. She sent check and saw that it cleared, but has not received an insurance card. Her appointment for Adamaris Hester is scheduled 2/3/20 (next business day). SW asked her to bring paperwork and writer will follow up with financial team and/or assist with coordination at appointment.      Will route to patient's current psychiatric provider(s) as an FYI.   Please call or EPIC message with any questions or concerns.    SHANA Montanez, LICSW  [phone]

## 2020-02-01 DIAGNOSIS — F33.1 MODERATE EPISODE OF RECURRENT MAJOR DEPRESSIVE DISORDER (H): ICD-10-CM

## 2020-02-03 ENCOUNTER — ALLIED HEALTH/NURSE VISIT (OUTPATIENT)
Dept: PSYCHIATRY | Facility: CLINIC | Age: 40
End: 2020-02-03
Attending: SOCIAL WORKER
Payer: COMMERCIAL

## 2020-02-03 ENCOUNTER — OFFICE VISIT (OUTPATIENT)
Dept: PSYCHIATRY | Facility: CLINIC | Age: 40
End: 2020-02-03
Attending: NURSE PRACTITIONER
Payer: COMMERCIAL

## 2020-02-03 VITALS — HEART RATE: 90 BPM | SYSTOLIC BLOOD PRESSURE: 128 MMHG | DIASTOLIC BLOOD PRESSURE: 85 MMHG

## 2020-02-03 DIAGNOSIS — F33.1 MODERATE EPISODE OF RECURRENT MAJOR DEPRESSIVE DISORDER (H): ICD-10-CM

## 2020-02-03 DIAGNOSIS — Z71.89 COUNSELING AND COORDINATION OF CARE: Primary | ICD-10-CM

## 2020-02-03 PROCEDURE — G0463 HOSPITAL OUTPT CLINIC VISIT: HCPCS | Mod: ZF

## 2020-02-03 RX ORDER — VENLAFAXINE HYDROCHLORIDE 150 MG/1
CAPSULE, EXTENDED RELEASE ORAL
Qty: 30 CAPSULE | Refills: 0 | OUTPATIENT
Start: 2020-02-03

## 2020-02-03 RX ORDER — VENLAFAXINE HYDROCHLORIDE 150 MG/1
CAPSULE, EXTENDED RELEASE ORAL
Qty: 30 CAPSULE | Refills: 11 | Status: SHIPPED | OUTPATIENT
Start: 2020-02-03 | End: 2021-01-13

## 2020-02-03 ASSESSMENT — PAIN SCALES - GENERAL: PAINLEVEL: NO PAIN (0)
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
## 2020-02-07 NOTE — PROGRESS NOTES
Social Work Consultation  [facility]      Patient Name:  Mariana Mckeon  /Age:  1980 (39 year old)    Presenting SW Need(s)/ Reason for visit:  SW provided phone support to Mariana regarding insurance and is checking on current status at her appointment today.    Collaborated With:    -Mariana Mike  -QUAN Jewell    Intervention:  Participated in the patient's psychiatry appt with Adamaris Hester.      -Mariana brought in paperwork that indicates she was approved for [place]. She reports paying first month of premiums, then receiving another notice requesting additional month. SW reviewed that [place]Care requires individuals to be 2 months ahead for payments to ensure continuity of health care coverage. SW encouraged her to call the [facility] to see if she has any options for February.  -SABINA provided Mariana a copy of [facility]'s Community Care application. She is likely eligible to get patient support funds to help pay for appointment today due to low income and limited assets. SW reviewed that she can submit a copy of letter from [facility] showing denial of MA. Mariana felt she could complete task independently.    Resources Provided:  -Community Care application    Social Work Assessment:  Mariana was able to follow up with appointment with Adamaris Hester today as she has not been seen for approximately 6 months. She was able to follow through with insurance application and is able to get needs met.    Plan:  No follow up needed at this time.  Provided patient with writer's contact information and availability.      Will route to patient's psychiatric provider(s) as an FYI.    Rosy Hernandez, SHANA, [facility]    This is a non-billable encounter as it was solely for the purposes of outreach and/or care coordination.

## 2020-06-04 ENCOUNTER — TELEPHONE (OUTPATIENT)
Dept: PSYCHIATRY | Facility: CLINIC | Age: 40
End: 2020-06-04

## 2020-06-04 NOTE — TELEPHONE ENCOUNTER
Last seen: 2/3  RTC: 1 year  Cancel: none   No-show: none   Next appt: none     Incoming refill from patient via phone     Medication requested: venlafaxine (EFFEXOR-XR) 150 MG 24 hr capsule  Directions: TAKE 1 CAPSULE BY MOUTH EVERY DAY  Qty: 30  Last refilled:     Medication refill approved per refill protocol

## 2020-06-04 NOTE — TELEPHONE ENCOUNTER
[facility] Call Center    Phone Message    May a detailed message be left on voicemail: yes     Reason for Call: Medication Refill Request    Has the patient contacted the pharmacy for the refill? Yes   Name of medication being requested: venlafaxine 150mg  Provider who prescribed the medication: QUAN Jewell CNP  Pharmacy: [facility] on [address] in [place] (fax number: [phone]; please remove [facility] [address] from pt's chart and change her preferred pharmacy to this [facility])  Date medication is needed: 6/8         Action Taken: Message routed to:  Other: [facility]    Travel Screening: Not Applicable

## 2020-06-04 NOTE — TELEPHONE ENCOUNTER
Writer placed a call to [facility] [id number] - [place], [place] - [address] and due to the pharmacy being closed the phone was transferred to [facility] in [place]. Writer spoke with a pharmacy who confirmed refills on file for venlafaxine 150 mg caps. Pharmacist requested that [facility] call them to transfer the medication over. Write agreed with the plan.     Placed a call to [facility] - [place], [place] - [address]  [phone] and spoke with pharmacist, Jennifer who agreed to request venlafaxine 150 mg tabs to be transferred over from [facility] pharmacy. Writer provided her with the phone number to the pharmacy.     Placed a call to patient to update her that Venlafaxine was transferred from [facility] to [facility] per her request. No answer at number provided. LVM, updating her that meds were transferred over per request.     No further action needed by this writer

## 2020-07-31 ENCOUNTER — VIRTUAL VISIT (OUTPATIENT)
Dept: FAMILY MEDICINE | Facility: CLINIC | Age: 40
End: 2020-07-31
Payer: COMMERCIAL

## 2020-07-31 DIAGNOSIS — T78.40XS HYPERSENSITIVITY, SEQUELA: ICD-10-CM

## 2020-07-31 DIAGNOSIS — R45.4 IRRITABILITY: Primary | ICD-10-CM

## 2020-07-31 ASSESSMENT — PATIENT HEALTH QUESTIONNAIRE - PHQ9: SUM OF ALL RESPONSES TO PHQ QUESTIONS 1-9: 10

## 2020-07-31 NOTE — PROGRESS NOTES
"Family Medicine Telephone Visit Note           Telephone Visit Consent   Patient was verbally read the following and verbal consent was obtained.    \"Telephone visits are billed at different rates depending on your insurance coverage. During this emergency period, for some insurers they may be billed the same as an in-person visit.  Please reach out to your insurance provider with any questions.  If during the course of the call the physician/provider feels a telephone visit is not appropriate, you will not be charged for this service.\"    Name person giving consent:  Patient   Date verbal consent given:  7/31/2020  Time verbal consent given:  8:31 AM     Chief Complaint   Patient presents with     Patient Request     Patient would like to discuss signs of Autism      Referral     Patient requesting referral to Urologist      Medication Request     Patient would like to discuss a sleep medication. She is wondering if she can be prescribed it             HPI   Patients name: Mariana  Appointment start time:  8:45 AM      Signs of autism  Throughout her life, although more specifically in the past 2 years, has been terminated from 4 jobs. Almost every job has had a sit down with boss \"you aren't doing things the way we expect\". Sometimes, \"doesn't do things fast enough\". But they haven't been able to put their finger on it. Terminated last year, feedback \"some people don't like working with you\", told she has a \"tone\" with people, unable to give specific examples, told it's \"all the time\". Describes self as \"Hypersensitive and emotional, difficulty emotionally regulating self to critic or bad energy\". Breaks down and cries easier than anyone she knows. Depression meds help with this somewhat, but doesn't help for specific situations. Hypersensitiviy to noises, smells, and visual stimulation. Can't listen to blinker in her car (it's too loud). Can't have blinker and windshield wipers on at the same time. Jumpy around the " "house, hard to live with roomates. Gets extremely irritated at things. Was in tears due to father lying to her when younger, when the lie was that they went bike shopping when he had initially said they were going to [employer] horse [employer]op. Doesn't connect well with people.   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 Woman asked patient if patient might be autistic. Received supportive listening from psychiatrist (Adamaris Hester) and therapist (Buddy Mcpherson). Psychotherapist is Buddy Mcpherson at [facility]. \"We've talked about it, but he's not an expert in autism, can't say one way or the other\".     Urogyn symptom  Wondering if needs UroGyn referral  Frequent urination. Wakes up 3-4 times per night and feels need to urinate, unable to fall back asleep unless she urinates.       Sleep med  Sleep problems for the past 20 years, thought due to depression. Sleeps a lot, but it's not quality sleep. Tired throughout the entire day. Unless actively drinking coffee, does not operate at a level similar to her peers. Used to fall asleep at parties.   Wakes up 3-4 times per night to urinate.   Going downstairs to do laundry, hard to have the energy to get up the stairs. Had energy problems before she was obese.   Depression medication helps mitigate, but some days noticeably hard.   No difficulty falling asleep.   Meds she's tried: melatonin (unsure of dose)  Yoga before bed (helps somewhat)  Mom takes Modafinal \"for sleep\", they are very similar and she wants to try this first.     PHQ 5/6/2019 6/24/2019 7/31/2020   PHQ-9 Total Score 12 5 10   Q9: Thoughts of better off dead/self-harm past 2 weeks Not at all Not at all Not at all   Q9=0      Current Outpatient Medications   Medication Sig Dispense Refill     venlafaxine (EFFEXOR-XR) 150 MG 24 hr capsule TAKE 1 CAPSULE BY MOUTH EVERY DAY 30 capsule 11     NO ACTIVE MEDICATIONS .       No Known Allergies         Review of Systems:          " "Physical Exam:     There were no vitals taken for this visit.  Estimated body mass index is 32.55 kg/m  as calculated from the following:    Height as of 9/19/17: 1.651 m (5' 5\").    Weight as of 10/2/18: 88.7 kg (195 lb 9.6 oz).    Exam:  Constitutional: healthy, alert and no distress  Psychiatric: mentation appears normal and affect normal/bright    Results from last visit:  Office Visit on 04/10/2019   Component Date Value Ref Range Status     Folate 04/10/2019 17.0  >5.4 ng/mL Final     Vitamin B12 04/10/2019 198  193 - 986 pg/mL Final     Vitamin D Deficiency screening 04/10/2019 11* 20 - 75 ug/L Final    Comment: Season, race, dietary intake, and treatment affect the concentration of   25-hydroxy-Vitamin D. Values may decrease during winter months and increase   during summer months. Values 20-29 ug/L may indicate Vitamin D insufficiency   and values <20 ug/L may indicate Vitamin D deficiency.  Vitamin D determination is routinely performed by an immunoassay specific for   25 hydroxyvitamin D3.  If an individual is on vitamin D2 (ergocalciferol)   supplementation, please specify 25 OH vitamin D2 and D3 level determination by   LCMSMS test VITD23.       Albumin 04/10/2019 4.4  3.8 - 5.0 mg/dL Final     Alkaline Phosphatase 04/10/2019 48.6  31.7 - 110.5 U/L Final     ALT 04/10/2019 12.7  0.0 - 45.0 U/L Final     AST 04/10/2019 14.5  0.0 - 45.0 U/L Final     Bilirubin Total 04/10/2019 0.5  0.2 - 1.3 mg/dL Final     Urea Nitrogen 04/10/2019 10.0  7.0 - 19.0 mg/dL Final     Calcium 04/10/2019 10.3* 8.5 - 10.1 mg/dL Final     Chloride 04/10/2019 104.5  98.0 - 110.0 mmol/L Final     Carbon Dioxide 04/10/2019 25.5  20.0 - 32.0 mmol/L Final     Creatinine 04/10/2019 0.8  0.5 - 1.0 mg/dL Final     Glucose 04/10/2019 92.1  70.0 - 99.0 mg'dL Final     Potassium 04/10/2019 4.3  3.3 - 4.5 mmol/dL Final     Sodium 04/10/2019 136.8  132.6 - 141.4 mmol/L Final     Protein Total 04/10/2019 6.6* 6.8 - 8.8 g/dL Final     GFR " Estimate 04/10/2019 84.4  >60.0 mL/min/1.7 m2 Final     GFR Estimate If Black 04/10/2019 >90  >60.0 mL/min/1.7 m2 Final     WBC 04/10/2019 6.7  4.0 - 11.0 K/uL Final     Lymphocytes # 04/10/2019 1.6  0.8 - 5.3 K/uL Final     % Lymphocytes 04/10/2019 23.2  20.0 - 48.0 %L Final     Mid # 04/10/2019 0.4  0.0 - 2.2 K/uL Final     Mid % 04/10/2019 6.2  0.0 - 20.0 %M Final     GRANULOCYTES # 04/10/2019 4.7  1.6 - 8.3 K/uL Final     % Granulocytes 04/10/2019 70.6  40.0 - 75.0 %G Final     RBC 04/10/2019 4.92  3.80 - 5.20 M/uL Final     Hemoglobin 04/10/2019 14.2  11.7 - 15.7 g/dL Final     Hematocrit 04/10/2019 45.1  35.0 - 47.0 % Final     MCV 04/10/2019 91.7  78.0 - 100.0 fL Final     MCH 04/10/2019 28.9  26.5 - 35.0 pg Final     MCHC 04/10/2019 31.5* 32.0 - 36.0 g/dL Final     Platelets 04/10/2019 350.0  150.0 - 450.0 K/uL Final     TSH 04/10/2019 1.26  0.40 - 4.00 mU/L Final           Assessment and Plan     Patient Instructions   Plan:  1. Video or in-person visit for further sleep and urinary frequency discussion (call [facility] to schedule)    2. Virtual visit with Adamaris Hester to discuss Sleep Medication (call their clinic to schedule)    3. Behavioral Health Referral for ASD diagnostic specialist (someone should call you)  - also provided with DSM-V diagnostic criteria for her reference/knowledge/education.       After Visit Information:  Patient chose to view AVS via [employer]    No follow-ups on file.    Appointment end time: 9:14 AM  This is a telephone visit that took 29 minutes.      Clinician location:  [facility]     Katt Cheema MD

## 2020-07-31 NOTE — PATIENT INSTRUCTIONS
Plan:  1. Video or in-person visit for further sleep and urinary frequency discussion (call [facility] to schedule)    2. Virtual visit with Adamaris Hester to discuss Sleep Medication (call their clinic to schedule)    3. Behavioral Health Referral for ASD diagnostic specialist (someone should call you)      FYI:  Autism Spectrum Disorder (ASD) Diagnostic Criteria  DSM, Fifth edition criteria - According to the DSM, Fifth edition (DSM-5) criteria, a diagnosis of ASD requires all of the following [24]:    1. Persistent deficits in social communication and social interaction in multiple settings; demonstrated by deficits in all three of the following (either currently or by history):    -Social-emotional reciprocity (eg, failure to produce mutually enjoyable and agreeable conversations or interactions because of a lack of mutual sharing of interests, lack of awareness or understanding of the thoughts or feelings of others)    -Nonverbal communicative behaviors used for social interaction (eg, difficulty coordinating verbal communication with its nonverbal aspects [eye contact, facial expressions, gestures, body language, and/or prosody/tone of voice])    -Developing, maintaining, and understanding relationships (eg, difficulty adjusting behavior to social setting, lack of ability to show expected social behaviors, lack of interest in socializing, difficulty making friends even when interested in having friendships)      2. Restricted, repetitive patterns of behavior, interests, or activities; demonstrated by ?2 of the following (either currently or by history):    -Stereotyped or repetitive movements, use of objects, or speech (eg, stereotypies such as rocking, flapping, or spinning); echolalia (repeating parts of speech; repeating scripts from movies or prior conversations)    -Insistence on sameness, unwavering adherence to routines, or ritualized patterns of verbal or nonverbal behavior (eg, ordering toys into a  line)    -Highly restricted, fixated interests that are abnormal in strength or focus (eg, preoccupation with certain objects [trains, vacuum , or parts of trains or vacuum ]); perseverative interests (eg, excessive focus on a topic such as dinosaurs or natural disasters)    -Increased or decreased response to sensory input or unusual interest in sensory aspects of the environment (eg, adverse response to particular sounds; apparent indifference to temperature; excessive touching/smelling of objects)      3. The symptoms must impair function (eg, social, academic, completing daily routines).      4. The symptoms must be present in the early developmental period. However, they may become apparent only after social demands exceed limited capacity; in later life, symptoms may be masked by learned strategies.      5. The symptoms are not better explained by intellectual disability (formerly referred to as mental retardation) or global developmental delay.

## 2020-08-05 ENCOUNTER — TELEPHONE (OUTPATIENT)
Dept: PSYCHOLOGY | Facility: CLINIC | Age: 40
End: 2020-08-05

## 2020-08-05 NOTE — TELEPHONE ENCOUNTER
I would have her call [facility], I believe they do adult autism evaluations.  They have a central number [phone].  I do not have any information as to how they are doing this during covid.

## 2020-08-06 NOTE — TELEPHONE ENCOUNTER
3:11p.m Spoke to patient, she would like information sent to her via mxHero. This has been completed.    Stephany Greene  Care Coordinator

## 2020-09-11 ENCOUNTER — VIRTUAL VISIT (OUTPATIENT)
Dept: PSYCHIATRY | Facility: CLINIC | Age: 40
End: 2020-09-11
Attending: NURSE PRACTITIONER
Payer: COMMERCIAL

## 2020-09-11 DIAGNOSIS — F33.1 MODERATE EPISODE OF RECURRENT MAJOR DEPRESSIVE DISORDER (H): Primary | ICD-10-CM

## 2020-09-11 ASSESSMENT — PAIN SCALES - GENERAL: PAINLEVEL: NO PAIN (0)
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
## 2020-09-11 NOTE — PATIENT INSTRUCTIONS
Thank you for coming to the PSYCHIATRY CLINIC.    Lab Testing:  If you had lab testing today and your results are reassuring or normal they will be mailed to you or sent through Fashion[employer] within 7 days. If the lab tests need quick action we will call you with the results. The phone number we will call with results is # [phone] (home) . If this is not the best number please call our clinic and change the number.    Medication Refills:  If you need any refills please call your pharmacy and they will contact us. Our fax number for refills is [phone]. Please allow three business for refill processing. If you need to  your refill at a new pharmacy, please contact the new pharmacy directly. The new pharmacy will help you get your medications transferred.     Scheduling:  If you have any concerns about today's visit or wish to schedule another appointment please call our office during normal business hours [phone] (8-5:00 M-F)    Contact Us:  Please call [phone] during business hours (8-5:00 M-F).  If after clinic hours, or on the weekend, please call  [phone].    Financial Assistance [phone]  [email or web address] Billing [phone]  Central Billing Office, [email or web address]: [phone]  [facility] Billing [phone]  Medical Records [phone]      MENTAL HEALTH CRISIS NUMBERS:  For a medical emergency please call  911 or go to the nearest ER.     [facility]:   [facility] -[phone]   Crisis Residence [facility] -[phone]   Walk-In Counseling [facility] -[phone]   COPE 24/7 [place] Mobile Team -[phone] (adults)/[phone] (child)  CHILD: [facility] needs assessment team - [phone]      [facility]:   [facility] - [phone]   Walk-in counseling [facility] - [phone]   Walk-in counseling [facility] - [phone]   Crisis Residence [facility] - [phone]  Urgent  [facility] Adult Mental Znzmlq-[phone] mobile unit/ 24/7 crisis line    National Crisis Numbers:   National Suicide Prevention Lifeline: 0-345-716-TALK (290-147-7624)  Poison Control Center - 9-128-819-8067  [facility]/resources for a list of additional resources (SOS)  Trans Lifeline a hotline for transgender people 5-843-259-8024  [employer] a hotline for LGBT youth 1-925.348.7010  Crisis Text Line: For any crisis 24/7   To: 478286  see www.crisistextline.org  - IF MAKING A CALL FEELS TOO HARD, send a text!         Again thank you for choosing PSYCHIATRY CLINIC and please let us know how we can best partner with you to improve you and your family's health.    You may be receiving a survey regarding this appointment. We would love to have your feedback, both positive and negative. The survey is done by an external company, so your answers are anonymous.

## 2020-09-22 ENCOUNTER — OFFICE VISIT (OUTPATIENT)
Dept: FAMILY MEDICINE | Facility: CLINIC | Age: 40
End: 2020-09-22
Payer: COMMERCIAL

## 2020-09-22 VITALS — SYSTOLIC BLOOD PRESSURE: 118 MMHG | HEART RATE: 72 BPM | DIASTOLIC BLOOD PRESSURE: 79 MMHG | OXYGEN SATURATION: 99 %

## 2020-09-22 DIAGNOSIS — R35.0 URINARY FREQUENCY: Primary | ICD-10-CM

## 2020-09-22 DIAGNOSIS — Z23 NEED FOR PROPHYLACTIC VACCINATION AND INOCULATION AGAINST INFLUENZA: ICD-10-CM

## 2020-09-22 LAB
BILIRUBIN UR: NEGATIVE MG/DL
BLOOD UR: NEGATIVE MG/DL
GLUCOSE URINE: NEGATIVE
KETONES UR QL: NEGATIVE MG/DL
LEUKOCYTE ESTERASE UR: NEGATIVE
NITRITE UR QL STRIP: NEGATIVE MG/DL
PH UR STRIP: 7.5 [PH] (ref 4.5–8)
PROTEIN UR: NEGATIVE MG/DL
SP GR UR STRIP: 1.02 (ref 1–1.03)
UROBILINOGEN UR STRIP-ACNC: NORMAL E.U./DL

## 2020-09-22 NOTE — PATIENT INSTRUCTIONS
Dr. Shah with OB/GYN clinic - Urogynecology. [facility] on [address] ([facility]) and at [facility] on [address] ([facility] [place] [place]   [phone]

## 2020-09-22 NOTE — PROGRESS NOTES
"       HPI       Mariana Mckeon is a 40 year old  who presents for   Chief Complaint   Patient presents with     Patient Request     Questions regarding urogynocology      Urinary frequency  Starting in late teens or early twenties, noticed had to urinate frequently. Overnight it wakes her up 1-2 times overnight (midnight, 3am). Will need to urinate 2-3 times in the going to bed stage, and then 2-3 times between 6-9 (if waking up at 9). Can't go back to sleep unless she urinates. Embarrassing to have to excuse herself to go to the bathroom frequently at work or social situations. Messes up her day when can't find a bathroom.   At age 25 saw Urogyn at [facility]. Discussed process/workup/urodynamic studies and she declined at the time. Will empty bladder, and then 2 minutes later feels she needs to urinate again. Has experimented with drinking different amount of liquid throughout the day, doesn't have a big effect unless severely dehydrates herself. Not drinking after 6pm does not decrease her overnight urination needs.     No painful urination. Sometimes a sense of urgency, discomfort. No urinary incontinence. No fecal incontinence.   Rare (once yearly) small amount of stress incontinence (sneeze, cough).     Mother has similar problem. A few years ago had \"sensor\" installed which tells her bladder to empty    HSV since adolescence. Normal periods. Occasional UTI - can identify triggers (no postcoital voiding). Last UTI about 5 years ago. Only gets them with sex, which is infrequent.     +++++++  Problem, Medication and Allergy Lists were reviewed and updated if needed..    Patient is an established patient of this clinic..         Review of Systems:   Review of Systems         Physical Exam:     Vitals:    09/22/20 1431   BP: 118/79   BP Location: Right arm   Patient Position: Sitting   Cuff Size: Adult Large   Pulse: 72   SpO2: 99%     There is no height or weight on file to calculate BMI.     Physical Exam      " Results:      Results from this visit  Results for orders placed or performed in visit on 09/22/20   Urinalysis, Micro If (UA) ([facility]achel)     Status: None   Result Value Ref Range    Specific Gravity Urine 1.020 1.005 - 1.030    pH Urine 7.5 4.5 - 8.0    Leukocyte Esterase UR Negative NEGATIVE    Nitrite Urine Negative NEGATIVE mg/dL    Protein UR Negative NEGATIVE mg/dL    Glucose Urine Negative NEGATIVE    Ketones Urine Negative NEGATIVE mg/dL    Urobilinogen mg/dL 1.0 E.U./dL 0.2 E.U./dL E.U./dL    Bilirubin UR Negative NEGATIVE mg/dL    Blood UR Negative NEGATIVE mg/dL       Assessment and Plan        Mariana was seen today for patient request and imm/inj.    Diagnoses and all orders for this visit:    Urinary frequency  No sign of UTI today. Given chronic nature of this concern (>20 years), will proceed with Urogyn referral for workup and treatment.   -     Urinalysis, Micro If (UA) ([facility])  -     UROLOGY ADULT REFERRAL; Future - Dr. Vernon    Need for prophylactic vaccination and inoculation against influenza  -     INFLUENZA VACCINE IM > 6 MONTHS VALENT IIV4 [84886]       There are no discontinued medications.    Options for treatment and follow-up care were reviewed with the patient. Mariana Mckeon  engaged in the decision making process and verbalized understanding of the options discussed and agreed with the final plan.    I spent 25 min face to face with the patient and >50% was spent counselling the patient about the above medical conditions, educating patient and discussing the recommendations and followup .    Katt Cheema MD  [facility]

## 2020-09-30 ENCOUNTER — VIRTUAL VISIT (OUTPATIENT)
Dept: UROLOGY | Facility: CLINIC | Age: 40
End: 2020-09-30
Attending: OBSTETRICS & GYNECOLOGY
Payer: COMMERCIAL

## 2020-09-30 DIAGNOSIS — R35.0 URINARY FREQUENCY: ICD-10-CM

## 2020-09-30 NOTE — LETTER
"9/30/2020       RE: Mariana Mckeon  [address]  [facility] [id number]     Dear Colleague,    Thank you for referring your patient, Mariana Mckeon, to the [facility] [place] at [facility]. Please see a copy of my visit note below.    Mariana Mckeon is a 40 year old female who is being evaluated via a billable telephone visit.      The patient has been notified of following:     \"This telephone visit will be conducted via a call between you and your physician/provider. We have found that certain health care needs can be provided without the need for a physical exam.  This service lets us provide the care you need with a short phone conversation.  If a prescription is necessary we can send it directly to your pharmacy.  If lab work is needed we can place an order for that and you can then stop by our lab to have the test done at a later time.    Telephone visits are billed at different rates depending on your insurance coverage. During this emergency period, for some insurers they may be billed the same as an in-person visit.  Please reach out to your insurance provider with any questions.    If during the course of the call the physician/provider feels a telephone visit is not appropriate, you will not be charged for this service.\"    Patient has given verbal consent for Telephone visit?  Yes    What phone number would you like to be contacted at? [phone]     How would you like to obtain your AVS? Carter     September 30, 2020    Referring Provider: Katt Cheema MD  [address] [place] [address]    Primary Care Provider: Katt Cheema    CC: urinary frequency    HPI:  Mariana Mckeon is a 40 year old female who presents for evaluation of her pelvic floor symptoms.  She has urinary frequency voiding up to 15/day. Nocturia X 3. Denies urgency.    Prolapse:  Do you feel a vaginal bulge? no                                      " Pressure? no   Do you have to place your fingers in the vagina or in the rectum to have a bowel movement? no  Impact to quality of life? -     Stress Incontinence:  Do you leak urine with cough, sneeze, exercise? yes  How often do you leak with cough, sneeze, exercise?  rare  How much do you usually leak? drops   Do you wear a pad? no If so; -  Impact to quality of life? minimal    Urge Incontinence:  Do you often get sudden urges to urinate? no  How often do have urges? -  If so, do you leak with these urges? -  How much do you usually leak? -  Impact to quality of life? -    Voids/day:12-13  Nocturia: 3  Fluid intake: 40-50 ounces  Caffeine: 12-24 ounces    Urinating:  Difficulty starting urination or strain to void? no  Weak or intermittent stream? no  Incomplete emptying or dribbling? yes  Pain or burning with urination? no  Any blood in your urine? no    GI:  Constipation? no  Frequency stools daily    Straining for stools no  Stool consistency normal     Ever leak stool (Accidental Bowel Leakage)? no      If so, how often?               -      If so, do you leak?                   -      Soiling without sensation? -  History of irritable bowel or Crohn's? no    Sexual/Pain:  Are you currently having sex? no  Pain with sex?   no   Sexual Partner: -  Do any of these symptoms interfere with sex? -  Impact to quality of life? -    Prior therapy:  Ever done pelvic floor physical therapy? no  Trial of medication? no  Have you ever tried a pessary? no    Medical History:  Do you have?   High Cholesterol? no     Diabetes? no  High Blood pressure? no     Recurrent UTIs? no  Sleep Apnea? no  Other medical problems: no    Surgical History:    Hysterectomy? no   Bladder Surgery? no   Other? no     OB/Gyn History:  Pregnancies? 1  Deliveries? 0  Vaginal 0  Section 0  Current birth control? no  Periods? yes  When was the first day of your last period? no  Last Pap smear? no Any abnormal? no  Last mammogram? no  Last  colonoscopy? no    Medications/Vitamins/Supplements: efexor    Drug Allergies: no    Latex Allergy: no  Iodine Allergy no    Family History: (list relationship and age at diagnosis)  Breast cancer? no   Ovarian cancer? no   Colon cancer? no  Other? no    Social History:  Marital status: single  Do you/ have you ever smoke(d)  cigarettes? no  Drink more than 1 alcoholic beverage a day?  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 no                   Unusual headaches? no  Leg pain w/ walking/exercise? no                       Easy bruising? no  Difficulty breathing w/ walking/exercise? no  Problems with vision? no  Dizziness, falls, or fainting? no  Excessive bleeding from cuts, gums, surgery? no  Other: -    Past Medical History:   Diagnosis Date     Depression      Migraine        Past Surgical History:   Procedure Laterality Date     HC TOOTH EXTRACTION W/FORCEP         Social History     Socioeconomic History     Marital status: Single     Spouse name: Not on file     Number of children: Not on file     Years of education: Not on file     Highest education level: Not on file   Occupational History     Not on file   Social Needs     Financial resource strain: Not on file     Food insecurity     Worry: Not on file     Inability: Not on file     Transportation needs     Medical: Not on file     Non-medical: Not on file   Tobacco Use     Smoking status: Former Smoker     Types: Cigarettes     Smokeless tobacco: Never Used     Tobacco comment: one per week   Substance and Sexual Activity     Alcohol use: Yes     Alcohol/week: 2.0 standard drinks     Types: 2 Standard drinks or equivalent per week     Comment: 2 per week     Drug use: Not on file     Comment: history of marijuana use, unknown if currently     Sexual activity: Not Currently     Partners: Male   Lifestyle     Physical activity     Days per week: Not on file     Minutes per session:  Not on file     Stress: Not on file   Relationships     Social connections     Talks on phone: Not on file     Gets together: Not on file     Attends Christian service: Not on file     Active member of club or organization: Not on file     Attends meetings of clubs or organizations: Not on file     Relationship status: Not on file     Intimate partner violence     Fear of current or ex partner: Not on file     Emotionally abused: Not on file     Physically abused: Not on file     Forced sexual activity: Not on file   Other Topics Concern     Not on file   Social History Narrative    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        Family History   Problem Relation Age of Onset     Alcohol/Drug Mother      Depression Mother      Obesity Mother      Depression Maternal Grandmother      Obesity Maternal Grandmother      Alcohol/Drug Maternal Grandfather      Cerebrovascular Disease Maternal Grandfather      Obesity Maternal Grandfather      Alcohol/Drug Paternal Grandmother      Prostate Cancer Paternal Grandfather      Alcohol/Drug Paternal Grandfather      Obesity Sister      Obesity Brother        ROS    No Known Allergies    Current Outpatient Medications   Medication     venlafaxine (EFFEXOR-XR) 150 MG 24 hr capsule     No current facility-administered medications for this visit.        LMP  (LMP Unknown)  No LMP recorded (lmp unknown). There is no height or weight on file to calculate BMI.  Ms. Mckeon is alert, comfortable in no acute distress, non-labored breathing.     A/P: Mariana Mckeon is a 40 year old F with urinary frequency     Refer to PFPT    A total of 30 minutes were spent with the patient today, > 50% in counseling and coordination of care    Brad Vernon MD  Professor, OB/GYN  Urogynecologist  CC  Patient Care Team:  Katt Cheema MD as PCP - General (Family Practice)  Ryland Benitez MD as MD (Orthopedics)  Buddy Mcpherson MA as Therapist (Licensed Mental Health)  Anette  Katt Mehta MD as Assigned PCP  Adamaris Hester, APRN CNP as Nurse Practitioner (Nurse Practitioner Psych/Mental Health)  Katt Lopez MD as Referring Physician (Family Practice)  Brad Vernon MD as MD (OB/Gyn)  KATT LOPEZ                Phone call duration: 30 minutes    Brad Vernon MD

## 2020-09-30 NOTE — PROGRESS NOTES
"Mariana Mckeon is a 40 year old female who is being evaluated via a billable telephone visit.      The patient has been notified of following:     \"This telephone visit will be conducted via a call between you and your physician/provider. We have found that certain health care needs can be provided without the need for a physical exam.  This service lets us provide the care you need with a short phone conversation.  If a prescription is necessary we can send it directly to your pharmacy.  If lab work is needed we can place an order for that and you can then stop by our lab to have the test done at a later time.    Telephone visits are billed at different rates depending on your insurance coverage. During this emergency period, for some insurers they may be billed the same as an in-person visit.  Please reach out to your insurance provider with any questions.    If during the course of the call the physician/provider feels a telephone visit is not appropriate, you will not be charged for this service.\"    Patient has given verbal consent for Telephone visit?  Yes    What phone number would you like to be contacted at? [phone]     How would you like to obtain your AVS? Carter     September 30, 2020    Referring Provider: Katt Cheema MD  [address] [place] [address]    Primary Care Provider: Katt Cheema    CC: urinary frequency    HPI:  Mariana Mckeon is a 40 year old female who presents for evaluation of her pelvic floor symptoms.  She has urinary frequency voiding up to 15/day. Nocturia X 3. Denies urgency.    Prolapse:  Do you feel a vaginal bulge? no                                      Pressure? no   Do you have to place your fingers in the vagina or in the rectum to have a bowel movement? no  Impact to quality of life? -     Stress Incontinence:  Do you leak urine with cough, sneeze, exercise? yes  How often do you leak with cough, sneeze, exercise?  rare  How much do you usually leak? " drops   Do you wear a pad? no If so; -  Impact to quality of life? minimal    Urge Incontinence:  Do you often get sudden urges to urinate? no  How often do have urges? -  If so, do you leak with these urges? -  How much do you usually leak? -  Impact to quality of life? -    Voids/day:12-13  Nocturia: 3  Fluid intake: 40-50 ounces  Caffeine: 12-24 ounces    Urinating:  Difficulty starting urination or strain to void? no  Weak or intermittent stream? no  Incomplete emptying or dribbling? yes  Pain or burning with urination? no  Any blood in your urine? no    GI:  Constipation? no  Frequency stools daily    Straining for stools no  Stool consistency normal     Ever leak stool (Accidental Bowel Leakage)? no      If so, how often?               -      If so, do you leak?                   -      Soiling without sensation? -  History of irritable bowel or Crohn's? no    Sexual/Pain:  Are you currently having sex? no  Pain with sex?   no   Sexual Partner: -  Do any of these symptoms interfere with sex? -  Impact to quality of life? -    Prior therapy:  Ever done pelvic floor physical therapy? no  Trial of medication? no  Have you ever tried a pessary? no    Medical History:  Do you have?   High Cholesterol? no     Diabetes? no  High Blood pressure? no     Recurrent UTIs? no  Sleep Apnea? no  Other medical problems: no    Surgical History:    Hysterectomy? no   Bladder Surgery? no   Other? no     OB/Gyn History:  Pregnancies? 1  Deliveries? 0  Vaginal 0  Section 0  Current birth control? no  Periods? yes  When was the first day of your last period? no  Last Pap smear? no Any abnormal? no  Last mammogram? no  Last colonoscopy? no    Medications/Vitamins/Supplements: efexor    Drug Allergies: no    Latex Allergy: no  Iodine Allergy no    Family History: (list relationship and age at diagnosis)  Breast cancer? no   Ovarian cancer? no   Colon cancer? no  Other? no    Social History:  Marital status: single  Do you/  have you ever smoke(d)  cigarettes? no  Drink more than 1 alcoholic beverage a day?  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 no                   Unusual headaches? no  Leg pain w/ walking/exercise? no                       Easy bruising? no  Difficulty breathing w/ walking/exercise? no  Problems with vision? no  Dizziness, falls, or fainting? no  Excessive bleeding from cuts, gums, surgery? no  Other: -    Past Medical History:   Diagnosis Date     Depression      Migraine        Past Surgical History:   Procedure Laterality Date     HC TOOTH EXTRACTION W/FORCEP         Social History     Socioeconomic History     Marital status: Single     Spouse name: Not on file     Number of children: Not on file     Years of education: Not on file     Highest education level: Not on file   Occupational History     Not on file   Social Needs     Financial resource strain: Not on file     Food insecurity     Worry: Not on file     Inability: Not on file     Transportation needs     Medical: Not on file     Non-medical: Not on file   Tobacco Use     Smoking status: Former Smoker     Types: Cigarettes     Smokeless tobacco: Never Used     Tobacco comment: one per week   Substance and Sexual Activity     Alcohol use: Yes     Alcohol/week: 2.0 standard drinks     Types: 2 Standard drinks or equivalent per week     Comment: 2 per week     Drug use: Not on file     Comment: history of marijuana use, unknown if currently     Sexual activity: Not Currently     Partners: Male   Lifestyle     Physical activity     Days per week: Not on file     Minutes per session: Not on file     Stress: Not on file   Relationships     Social connections     Talks on phone: Not on file     Gets together: Not on file     Attends Nondenominational service: Not on file     Active member of club or organization: Not on file     Attends meetings of clubs or organizations: Not on file      Relationship status: Not on file     Intimate partner violence     Fear of current or ex partner: Not on file     Emotionally abused: Not on file     Physically abused: Not on file     Forced sexual activity: Not on file   Other Topics Concern     Not on file   Social History Narrative    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        Family History   Problem Relation Age of Onset     Alcohol/Drug Mother      Depression Mother      Obesity Mother      Depression Maternal Grandmother      Obesity Maternal Grandmother      Alcohol/Drug Maternal Grandfather      Cerebrovascular Disease Maternal Grandfather      Obesity Maternal Grandfather      Alcohol/Drug Paternal Grandmother      Prostate Cancer Paternal Grandfather      Alcohol/Drug Paternal Grandfather      Obesity Sister      Obesity Brother        ROS    No Known Allergies    Current Outpatient Medications   Medication     venlafaxine (EFFEXOR-XR) 150 MG 24 hr capsule     No current facility-administered medications for this visit.        LMP  (LMP Unknown)  No LMP recorded (lmp unknown). There is no height or weight on file to calculate BMI.  Ms. Mckeon is alert, comfortable in no acute distress, non-labored breathing.     A/P: Mariana Mckeon is a 40 year old F with urinary frequency     Refer to PFPT    A total of 30 minutes were spent with the patient today, > 50% in counseling and coordination of care    Brad Vernon MD  Professor, OB/GYN  Urogynecologist  CC  Patient Care Team:  Padmini Lopez MD as PCP - General (Family Practice)  Ryland Benitez MD as MD (Orthopedics)  Buddy Mcpherson MA as Therapist (Licensed Mental Health)  Padmini Lopez MD as Assigned PCP  Adamaris Hester APRN CNP as Nurse Practitioner (Nurse Practitioner Psych/Mental Health)  Padmini Lopez MD as Referring Physician (Family Practice)  Brad Vernon MD as MD (OB/Gyn)  PADMINI LOPEZ                Phone call duration: 30 minutes    Brad  FATMATA Vernon MD

## 2020-11-22 ENCOUNTER — HEALTH MAINTENANCE LETTER (OUTPATIENT)
Age: 40
End: 2020-11-22

## 2020-12-08 ENCOUNTER — TELEPHONE (OUTPATIENT)
Dept: PSYCHIATRY | Facility: CLINIC | Age: 40
End: 2020-12-08

## 2020-12-08 NOTE — TELEPHONE ENCOUNTER
[facility] Call Center    Phone Message    May a detailed message be left on voicemail: yes     Reason for Call: Medication Refill Request    Has the patient contacted the pharmacy for the refill? Yes   Name of medication being requested: venlefaxine  Provider who prescribed the medication: Mir  Pharmacy: [facility] - [place], [place] - [address]    Date medication is needed: within the month      Action Taken: Message routed to:  Other: nursing pool    Travel Screening: Not Applicable

## 2020-12-08 NOTE — TELEPHONE ENCOUNTER
Last seen: 9/11  RTC: 6 months   Cancel: none   No-show: none  Next appt: 9/11    Incoming refill from patient via phone     Medication requested: venlafaxine (EFFEXOR-XR) 150 MG 24 hr capsule  Directions: TAKE 1 CAPSULE BY MOUTH EVERY DAY  Qty: 30  Last refilled: 2/3    Per chart review, patient should have refills on file. Patient notified to reach out to preferred pharmacy and have them request the medication from [facility] [id number] [place] [address]. Patient agreed with the plan.      No further action needed by this writer

## 2021-01-10 DIAGNOSIS — F33.1 MODERATE EPISODE OF RECURRENT MAJOR DEPRESSIVE DISORDER (H): ICD-10-CM

## 2021-01-13 RX ORDER — VENLAFAXINE HYDROCHLORIDE 150 MG/1
150 CAPSULE, EXTENDED RELEASE ORAL DAILY
Qty: 30 CAPSULE | Refills: 0 | Status: SHIPPED | OUTPATIENT
Start: 2021-01-13 | End: 2021-03-01

## 2021-01-13 NOTE — TELEPHONE ENCOUNTER
Medication requested: venlafaxine (EFFEXOR-XR) 150 MG 24 hr capsule  Last refilled: 1/10/21  Qty: 30      Last seen: 9/11/20  RTC: 6 months  Cancel: 0  No-show: 0  Next appt: 3/1/21    Refill decision:   Refilled for 30 days per protocol.

## 2021-01-27 ENCOUNTER — AMBULATORY - HEALTHEAST (OUTPATIENT)
Dept: NURSING | Facility: CLINIC | Age: 41
End: 2021-01-27

## 2021-02-17 ENCOUNTER — AMBULATORY - HEALTHEAST (OUTPATIENT)
Dept: NURSING | Facility: CLINIC | Age: 41
End: 2021-02-17

## 2021-03-01 ENCOUNTER — VIRTUAL VISIT (OUTPATIENT)
Dept: PSYCHIATRY | Facility: CLINIC | Age: 41
End: 2021-03-01
Attending: NURSE PRACTITIONER
Payer: COMMERCIAL

## 2021-03-01 DIAGNOSIS — F33.1 MODERATE EPISODE OF RECURRENT MAJOR DEPRESSIVE DISORDER (H): ICD-10-CM

## 2021-03-01 PROCEDURE — 99213 OFFICE O/P EST LOW 20 MIN: CPT | Mod: 95 | Performed by: NURSE PRACTITIONER

## 2021-03-01 RX ORDER — VENLAFAXINE HYDROCHLORIDE 150 MG/1
150 CAPSULE, EXTENDED RELEASE ORAL DAILY
Qty: 30 CAPSULE | Refills: 5 | Status: SHIPPED | OUTPATIENT
Start: 2021-03-01 | End: 2021-09-16
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
## 2021-07-09 ENCOUNTER — TELEPHONE (OUTPATIENT)
Dept: PSYCHIATRY | Facility: CLINIC | Age: 41
End: 2021-07-09

## 2021-07-09 NOTE — TELEPHONE ENCOUNTER
FW: Pt with ketamine appt  Received: Today  Message Contents   Adamaris Hester APRN CNP Patel, Radhika, RN             Spoke with Mariana, she's filling out forms prior to 7/28 and will make sure they have all the information they need on her medical and mental health meds. She wants that she's getting this treatment considered in her chart. She will call us to let us know how the intake goes.     ANTOINETTE    Previous Messages    ----- Message -----   From: Noemy Mohan   Sent: 7/5/2021   1:37 PM CDT   To: QUAN Jasso CNP   Subject: Pt with ketamine appt                             Pt has appt 7/28 with another clinic to talk about ketamine treatment. She wanted to know if she should be discussing this with you or your nurse before that appointment. I told her I'd let you know and see what you think. She says MyChart is not a good way to contact her and that she prefers for someone to call her.

## 2021-09-16 DIAGNOSIS — F33.1 MODERATE EPISODE OF RECURRENT MAJOR DEPRESSIVE DISORDER (H): ICD-10-CM

## 2021-09-16 RX ORDER — VENLAFAXINE HYDROCHLORIDE 150 MG/1
150 CAPSULE, EXTENDED RELEASE ORAL DAILY
Qty: 30 CAPSULE | Refills: 0 | Status: SHIPPED | OUTPATIENT
Start: 2021-09-16 | End: 2021-10-15

## 2021-09-16 NOTE — TELEPHONE ENCOUNTER
Last seen: 3/1  RTC: 6 months   Cancel: 9/3  No-show: none   Next appt: 10/15    Incoming refill from patient via phone     Medication requested: venlafaxine (EFFEXOR-XR) 150 MG 24 hr capsule  Directions: Take 1 capsule (150 mg) by mouth daily - Oral  Qty: 30  Last refilled: 3/1    Will route to provider for approval

## 2021-09-16 NOTE — TELEPHONE ENCOUNTER
Health Call Center    Phone Message    May a detailed message be left on voicemail: yes     Reason for Call: Medication Refill Request    Has the patient contacted the pharmacy for the refill? Yes   Name of medication being requested: venlafaxine  Provider who prescribed the medication: QUAN Jewell CNP  [facility] [address] [place] [address]  Date medication is needed: ASAP - pt will be in [place] on 9/17/21    Patient would like a call with an update when this has been sent to the pharmacy.    Patient is scheduled for the next available appointment on 10/15/21. She is asking for a 1.5 - 2 month supply of medication.      Action Taken: Message routed to:  Other: GUSTAVO Hammond    Travel Screening: Not Applicable

## 2021-09-17 RX ORDER — VENLAFAXINE HYDROCHLORIDE 150 MG/1
150 CAPSULE, EXTENDED RELEASE ORAL DAILY
Qty: 30 CAPSULE | Refills: 0 | OUTPATIENT
Start: 2021-09-17

## 2021-09-19 ENCOUNTER — HEALTH MAINTENANCE LETTER (OUTPATIENT)
Age: 41
End: 2021-09-19

## 2021-10-15 ENCOUNTER — VIRTUAL VISIT (OUTPATIENT)
Dept: PSYCHIATRY | Facility: CLINIC | Age: 41
End: 2021-10-15
Attending: NURSE PRACTITIONER
Payer: COMMERCIAL

## 2021-10-15 DIAGNOSIS — F33.1 MODERATE EPISODE OF RECURRENT MAJOR DEPRESSIVE DISORDER (H): ICD-10-CM

## 2021-10-15 PROCEDURE — 99214 OFFICE O/P EST MOD 30 MIN: CPT | Mod: 95 | Performed by: NURSE PRACTITIONER

## 2021-10-15 RX ORDER — VENLAFAXINE HYDROCHLORIDE 150 MG/1
150 CAPSULE, EXTENDED RELEASE ORAL DAILY
Qty: 30 CAPSULE | Refills: 5 | Status: SHIPPED | OUTPATIENT
Start: 2021-10-15 | End: 2022-04-15
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
## 2022-01-09 ENCOUNTER — HEALTH MAINTENANCE LETTER (OUTPATIENT)
Age: 42
End: 2022-01-09

## 2022-02-08 ENCOUNTER — OFFICE VISIT (OUTPATIENT)
Dept: FAMILY MEDICINE | Facility: CLINIC | Age: 42
End: 2022-02-08
Payer: COMMERCIAL

## 2022-02-08 VITALS
RESPIRATION RATE: 16 BRPM | SYSTOLIC BLOOD PRESSURE: 127 MMHG | HEART RATE: 88 BPM | DIASTOLIC BLOOD PRESSURE: 83 MMHG | OXYGEN SATURATION: 98 % | TEMPERATURE: 98.3 F

## 2022-02-08 DIAGNOSIS — R87.610 ASCUS WITH POSITIVE HIGH RISK HPV CERVICAL: ICD-10-CM

## 2022-02-08 DIAGNOSIS — Z00.00 ROUTINE GENERAL MEDICAL EXAMINATION AT A HEALTH CARE FACILITY: Primary | ICD-10-CM

## 2022-02-08 DIAGNOSIS — R87.810 ASCUS WITH POSITIVE HIGH RISK HPV CERVICAL: ICD-10-CM

## 2022-02-08 DIAGNOSIS — L60.0 INGROWN TOENAIL: ICD-10-CM

## 2022-02-08 DIAGNOSIS — Z11.3 ROUTINE SCREENING FOR STI (SEXUALLY TRANSMITTED INFECTION): ICD-10-CM

## 2022-02-08 LAB
CLUE CELLS: ABNORMAL
CLUE CELLS: NORMAL
TRICHOMONAS, WET PREP: ABNORMAL
TRICHOMONAS, WET PREP: NORMAL
WBC'S/HIGH POWER FIELD, WET PREP: ABNORMAL
WBC'S/HIGH POWER FIELD, WET PREP: NORMAL
YEAST, WET PREP: ABNORMAL
YEAST, WET PREP: NORMAL

## 2022-02-08 PROCEDURE — 87340 HEPATITIS B SURFACE AG IA: CPT | Performed by: FAMILY MEDICINE

## 2022-02-08 PROCEDURE — 86780 TREPONEMA PALLIDUM: CPT | Performed by: FAMILY MEDICINE

## 2022-02-08 PROCEDURE — 87210 SMEAR WET MOUNT SALINE/INK: CPT | Performed by: FAMILY MEDICINE

## 2022-02-08 PROCEDURE — 86803 HEPATITIS C AB TEST: CPT | Performed by: FAMILY MEDICINE

## 2022-02-08 PROCEDURE — 87591 N.GONORRHOEAE DNA AMP PROB: CPT | Performed by: FAMILY MEDICINE

## 2022-02-08 PROCEDURE — 99396 PREV VISIT EST AGE 40-64: CPT | Performed by: FAMILY MEDICINE

## 2022-02-08 PROCEDURE — 36415 COLL VENOUS BLD VENIPUNCTURE: CPT | Performed by: FAMILY MEDICINE

## 2022-02-08 PROCEDURE — 87624 HPV HI-RISK TYP POOLED RSLT: CPT | Performed by: FAMILY MEDICINE

## 2022-02-08 PROCEDURE — G0145 SCR C/V CYTO,THINLAYER,RESCR: HCPCS | Performed by: FAMILY MEDICINE

## 2022-02-08 PROCEDURE — 87491 CHLMYD TRACH DNA AMP PROBE: CPT | Performed by: FAMILY MEDICINE

## 2022-02-08 PROCEDURE — 87389 HIV-1 AG W/HIV-1&-2 AB AG IA: CPT | Performed by: FAMILY MEDICINE

## 2022-02-08 ASSESSMENT — PATIENT HEALTH QUESTIONNAIRE - PHQ9: SUM OF ALL RESPONSES TO PHQ QUESTIONS 1-9: 13

## 2022-02-08 NOTE — PROGRESS NOTES
Female Physical Note          HPI         Concerns today:     Ingrown toenail for a long time - wants a referral to a podiatrist  Many years of intermittent issues with large toe.     Left arm pain  For the past 6 weeks  thinks related to back pain. Sometimes tingling sensation on bottom area of upper left arm. No numbness. Not sharp. Does not prevent any functioning.   Left back/shoulder pain intermittently for years, but worse Fall 2021, got chiro and massages - back feels better now, but arm is new/worse.   No problems with range of motion. No weakness.   Not interested in workup or treatment.     Depression  Ketamine treatments started Aug 2021,paused while living away from [facility] now that back in town thinking of restarting.   SI a few times per year  Mental health wise currently feels like doing better  Patient Active Problem List   Diagnosis     Closed fracture of left tibia and fibula with routine healing     Moderate episode of recurrent major depressive disorder (H)     ASCUS with positive high risk HPV cervical     Social anxiety disorder       Past Medical History:   Diagnosis Date     Depression      Migraine       Family History   Problem Relation Age of Onset     Alcohol/Drug Mother      Depression Mother      Obesity Mother      Depression Maternal Grandmother      Obesity Maternal Grandmother      Alcohol/Drug Maternal Grandfather      Cerebrovascular Disease Maternal Grandfather      Obesity Maternal Grandfather      Alcohol/Drug Paternal Grandmother      Prostate Cancer Paternal Grandfather      Alcohol/Drug Paternal Grandfather      Obesity Sister      Obesity Brother               Review of Systems:     Review of Systems:  CONSTITUTIONAL: Reports fatigue/sleepiness. NEGATIVE for fever, chills, change in weight  INTEGUMENTARY/SKIN: NEGATIVE for worrisome rashes, moles or lesions  EYES: NEGATIVE for vision changes or irritation  ENT/MOUTH: NEGATIVE for ear, mouth and throat problems  RESP:  NEGATIVE for significant cough or SOB  BREAST: NEGATIVE for masses, tenderness or discharge  CV: NEGATIVE for chest pain, palpitations or peripheral edema  GI: NEGATIVE for nausea, abdominal pain, heartburn, or change in bowel habits  : Increase urinary frequency, especially at night. NEGATIVE for dysuria, or hematuria  MUSCULOSKELETAL: See HPI  NEURO: NEGATIVE for weakness, dizziness or paresthesias  ENDOCRINE: NEGATIVE for temperature intolerance, skin/hair changes  HEME/ALLERGY: NEGATIVE for bleeding problems  PSYCHIATRIC: See HPI         Social History     Social History     Socioeconomic History     Marital status: Single     Spouse name: Not on file     Number of children: Not on file     Years of education: Not on file     Highest education level: Not on file   Occupational History     Not on file   Tobacco Use     Smoking status: Former Smoker     Types: Cigarettes     Smokeless tobacco: Never Used     Tobacco comment: one per week   Vaping Use     Vaping Use: Never used   Substance and Sexual Activity     Alcohol use: Yes     Alcohol/week: 2.0 standard drinks     Types: 2 Standard drinks or equivalent per week     Comment: 2 per week     Drug use: Yes     Types: Marijuana     Comment: history of marijuana use, unknown if currently     Sexual activity: Not Currently     Partners: Male   Other Topics Concern     Not on file   Social History Narrative    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      Social Determinants of Health     Financial Resource Strain: Not on file   Food Insecurity: Not on file   Transportation Needs: Not on file   Physical Activity: Not on file   Stress: Not on file   Social Connections: Not on file   Intimate Partner Violence: Not on file   Housing Stability: Not on file         Sexual Health   Requests STI testing    Last Pap Smear Date:   Lab Results   Component Value Date    PAP NIL 09/19/2017    PAP NIL 10/18/2016    PAP ASC-US 11/12/2015     Abnormal Pap History:    11/12/2015: Pap - ASCUS on pap with positive testing for HR HPV (not HPV 16 or 18)  10/18/2016: Colposcopy with ECC showing no evidence of dysplasia or malignancy. Pap - NIL, HPV negative  9/19/2017: Pap - NIL, HPV negative  Plan: Pap/HPV cotest in 3 years (Sept 2020)    Recommended Screening            Physical Exam:     Vitals: /83   Pulse 88   Temp 98.3  F (36.8  C) (Oral)   Resp 16   LMP 02/01/2022 (Approximate)   SpO2 98%   Breastfeeding No   BMI= There is no height or weight on file to calculate BMI.   GENERAL: healthy, alert and no distress  EYES: Eyes grossly normal to inspection, extraocular movements - intact, and PERRL  NECK: no tenderness, no adenopathy, no asymmetry, no masses, no stiffness; thyroid- normal to palpation  RESP: lungs clear to auscultation - no rales, no rhonchi, no wheezes  CV: regular rates and rhythm, normal S1 S2, no S3 or S4 and no murmur, no click or rub -  ABDOMEN: soft, no tenderness, no  hepatosplenomegaly, no masses, normal bowel sounds  MS: extremities- no gross deformities noted, no edema  SKIN: no suspicious lesions, no rashes  NEURO: strength and tone- normal, sensory exam- grossly normal, mentation- intact, speech- normal, reflexes- symmetric  BACK: no CVA tenderness, no paralumbar tenderness  - female: cervix- normal, adnexae- normal; uterus- normal, no masses, no discharge  PSYCH: Alert and oriented times 3; speech- coherent , normal rate and volume; able to articulate logical thoughts, able to abstract reason, no tangential thoughts, no hallucinations or delusions, affect- normal  LYMPHATICS: ant. cervical- normal, post. cervical- normal, axillary- normal, supraclavicular- normal, inguinal- normal    Results for orders placed or performed in visit on 02/08/22   Treponema Abs w Reflex to RPR and Titer     Status: Normal   Result Value Ref Range    Treponema Antibody Total Nonreactive Nonreactive   HIV Antigen Antibody Combo     Status: Normal   Result  Value Ref Range    HIV Antigen Antibody Combo Nonreactive Nonreactive   Hepatitis B surface antigen     Status: Normal   Result Value Ref Range    Hepatitis B Surface Antigen Nonreactive Nonreactive   Hepatitis C antibody     Status: Normal   Result Value Ref Range    Hepatitis C Antibody Nonreactive Nonreactive    Narrative    Assay performance characteristics have not been established for newborns, infants, and children.   Pap screen with HPV - recommended age 30 - 65 years     Status: None   Result Value Ref Range    Interpretation        Negative for Intraepithelial Lesion or Malignancy (NILM)    Comment         Papanicolaou Test Limitations:  Cervical cytology is a screening test with limited sensitivity, and regular screening is critical for cancer prevention.  Pap tests are primarily effective for the diagnosis/prevention of squamous cell carcinoma, not adenocarcinoma or other cancers.        Specimen Adequacy       Satisfactory for evaluation, endocervical/transformation zone component absent    Clinical Information       none      LMP/Menopause Date       2/1/2022      Reflex Testing Yes regardless of result     Previous Abnormal?       No      Performing Labs       The technical component of this testing was completed at [facility]     Wet prep     Status: Normal    Specimen: Vagina; Swab   Result Value Ref Range    Trichomonas Absent Absent    Yeast Absent Absent    Clue Cells Absent Absent    WBCs/high power field None None    Narrative    No odor and , 4.5pH   Chlamydia trachomatis/Neisseria gonorrhoeae by PCR - Clinic Collect     Status: Normal    Specimen: Urine, Voided   Result Value Ref Range    Chlamydia Trachomatis Negative Negative    Neisseria gonorrhoeae Negative Negative   Wet preparation - Clinic Collect     Status: Abnormal    Specimen: Vagina; Swab   Result Value Ref Range    Trichomonas Absent Absent    Yeast Absent Absent     Clue Cells Absent Absent    WBCs/high power field 1+ (A) None    Narrative    No odor and < 4.5pH         Assessment and Plan     Mariana was seen today for physical.    Diagnoses and all orders for this visit:    Routine general medical examination at a health care facility  -     Hepatitis C antibody; Future    ASCUS with positive high risk HPV cervical  -     Pap screen with HPV today    Routine screening for STI (sexually transmitted infection)  -     Wet prep  -     Chlamydia trachomatis/Neisseria gonorrhoeae by PCR - Clinic Collect  -     Treponema Abs w Reflex to RPR and Titer; Future  -     HIV Antigen Antibody Combo; Future  -     Hepatitis B surface antigen; Future  -     Wet preparation - Clinic Collect  -     Treponema Abs w Reflex to RPR and Titer  -     HIV Antigen Antibody Combo  -     Hepatitis B surface antigen    1. Health Care Maintenance: Normal Physical Exam    Options for treatment and follow-up care were reviewed with the patient . Mariana Mckeon and/or guardian engaged in the decision making process and verbalized understanding of the options discussed and agreed with the final plan.    Katt Cheema MD  [facility], [facility]

## 2022-02-09 LAB
C TRACH DNA SPEC QL PROBE+SIG AMP: NEGATIVE
HBV SURFACE AG SERPL QL IA: NONREACTIVE
HCV AB SERPL QL IA: NONREACTIVE
HIV 1+2 AB+HIV1 P24 AG SERPL QL IA: NONREACTIVE
N GONORRHOEA DNA SPEC QL NAA+PROBE: NEGATIVE
T PALLIDUM AB SER QL: NONREACTIVE

## 2022-02-09 NOTE — RESULT ENCOUNTER NOTE
The results from your recent testing so far are normal  - Negative for yeast or trichomonas  - Negative for syphilis  - Other tests still pending    If you have any further questions feel free to contact me via [employer] message.     Sincerely,   Katt Cheema MD

## 2022-02-10 LAB
BKR LAB AP GYN ADEQUACY: NORMAL
BKR LAB AP GYN INTERPRETATION: NORMAL
BKR LAB AP HPV REFLEX: NORMAL
BKR LAB AP LMP: NORMAL
BKR LAB AP PREVIOUS ABNORMAL: NORMAL
PATH REPORT.COMMENTS IMP SPEC: NORMAL
PATH REPORT.COMMENTS IMP SPEC: NORMAL
PATH REPORT.RELEVANT HX SPEC: NORMAL

## 2022-02-14 NOTE — RESULT ENCOUNTER NOTE
Further STI testing is negative: gonorrhea, chlamydia, Hepatitis B, Hepatitis C, HIV. Still waiting all of your pap/HPV results.     Sincerely,   Katt Cheema MD

## 2022-02-15 LAB
HUMAN PAPILLOMA VIRUS 16 DNA: NEGATIVE
HUMAN PAPILLOMA VIRUS 18 DNA: NEGATIVE
HUMAN PAPILLOMA VIRUS FINAL DIAGNOSIS: ABNORMAL
HUMAN PAPILLOMA VIRUS OTHER HR: POSITIVE

## 2022-02-16 ENCOUNTER — TELEPHONE (OUTPATIENT)
Dept: FAMILY MEDICINE | Facility: CLINIC | Age: 42
End: 2022-02-16
Payer: COMMERCIAL

## 2022-02-16 PROBLEM — R87.610 ASCUS WITH POSITIVE HIGH RISK HPV CERVICAL: Status: ACTIVE | Noted: 2017-09-25

## 2022-02-16 PROBLEM — R87.810 ASCUS WITH POSITIVE HIGH RISK HPV CERVICAL: Status: ACTIVE | Noted: 2017-09-25

## 2022-02-16 NOTE — RESULT ENCOUNTER NOTE
Mariana,   Your pap test is negative. Your HPV test is positive. Your 5-year risk of having  abnormal cells on your cervix that could lead to cancer is 2.77%, which is still fairly low. The recommended follow up is a pap and HPV test in 1 year.

## 2022-02-16 NOTE — TELEPHONE ENCOUNTER
Team-    Please enter for pap tracking    PCP: Katt Cheema  41 year old    11/12/2015: Pap - ASCUS on pap with positive testing for HR HPV (not HPV 16 or 18)  10/18/2016: Colposcopy with ECC showing no evidence of dysplasia or malignancy. Pap - NIL, HPV negative  9/19/2017: Pap - NIL, HPV negative. Plan: Pap/HPV cotest in 3 years (Sept 2020)  2/8/22: Pap NIL, HPV other positive  Plan: Pap/HPV cotest in 1 year (Feb 2023)    Problem list (and overview) has been updated: Yes  Health care Maintenance has been updated: Yes    Patient contacted with results and plan via [facility]    Katt Cheema MD

## 2022-04-07 ENCOUNTER — OFFICE VISIT (OUTPATIENT)
Dept: FAMILY MEDICINE | Facility: CLINIC | Age: 42
End: 2022-04-07
Payer: COMMERCIAL

## 2022-04-07 VITALS
HEART RATE: 94 BPM | OXYGEN SATURATION: 97 % | TEMPERATURE: 97.6 F | DIASTOLIC BLOOD PRESSURE: 84 MMHG | SYSTOLIC BLOOD PRESSURE: 120 MMHG

## 2022-04-07 DIAGNOSIS — R35.0 URINARY FREQUENCY: Primary | ICD-10-CM

## 2022-04-07 DIAGNOSIS — N30.01 ACUTE CYSTITIS WITH HEMATURIA: ICD-10-CM

## 2022-04-07 LAB
ALBUMIN UR-MCNC: >=300 MG/DL
APPEARANCE UR: CLEAR
BACTERIA #/AREA URNS HPF: ABNORMAL /HPF
BILIRUB UR QL STRIP: NEGATIVE
COLOR UR AUTO: YELLOW
GLUCOSE UR STRIP-MCNC: NEGATIVE MG/DL
HGB UR QL STRIP: ABNORMAL
KETONES UR STRIP-MCNC: >=160 MG/DL
LEUKOCYTE ESTERASE UR QL STRIP: ABNORMAL
NITRATE UR QL: POSITIVE
PH UR STRIP: 5.5 [PH] (ref 5–8)
RBC #/AREA URNS AUTO: ABNORMAL /HPF
SP GR UR STRIP: >=1.03 (ref 1–1.03)
SQUAMOUS #/AREA URNS AUTO: ABNORMAL /LPF
UROBILINOGEN UR STRIP-ACNC: 0.2 E.U./DL
WBC #/AREA URNS AUTO: ABNORMAL /HPF
WBC CLUMPS #/AREA URNS HPF: PRESENT /HPF

## 2022-04-07 PROCEDURE — 99214 OFFICE O/P EST MOD 30 MIN: CPT | Performed by: FAMILY MEDICINE

## 2022-04-07 PROCEDURE — 87086 URINE CULTURE/COLONY COUNT: CPT | Performed by: FAMILY MEDICINE

## 2022-04-07 PROCEDURE — 87088 URINE BACTERIA CULTURE: CPT | Performed by: FAMILY MEDICINE

## 2022-04-07 PROCEDURE — 81001 URINALYSIS AUTO W/SCOPE: CPT | Performed by: FAMILY MEDICINE

## 2022-04-07 RX ORDER — SULFAMETHOXAZOLE/TRIMETHOPRIM 800-160 MG
1 TABLET ORAL 2 TIMES DAILY
Qty: 6 TABLET | Refills: 0 | Status: SHIPPED | OUTPATIENT
Start: 2022-04-07 | End: 2022-04-10

## 2022-04-07 NOTE — PATIENT INSTRUCTIONS
Patient Education   Here is the plan from today's visit    1. Urinary frequency  - UA reflex to Microscopic and Culture; Future  - UA reflex to Microscopic and Culture  - Urine Microscopic Exam  - Urine Culture    2. Acute cystitis with hematuria  - sulfamethoxazole-trimethoprim (BACTRIM DS) 800-160 MG tablet; Take 1 tablet by mouth 2 times daily for 3 days  Dispense: 6 tablet; Refill: 0    Please call or return to clinic if your symptoms don't go away.    Follow up plan  Return in about 1 week (around 4/14/2022), or if symptoms worsen or fail to improve.    Thank you for coming to [facility] today.  Lab Testing:  **If you had lab testing today and your results are reassuring or normal they will be mailed to you or sent through [employer] within 7 days.   **If the lab tests need quick action we will call you with the results.  **If you are having labs done on a different day, please call [phone] to schedule at [facility] or [phone] for other [facility] locations. Labs do not offer walk-in appointments.  The phone number we will call with results is # [phone] (home) . If this is not the best number please call our clinic and change the number.  Medication Refills:  If you need any refills please call your pharmacy and they will contact us.   If you need to  your refill at a new pharmacy, please contact the new pharmacy directly. The new pharmacy will help you get your medications transferred faster.   Scheduling:  If you have any concerns about today's visit or wish to schedule another appointment please call our office during normal business hours [phone] (8-5:00 M-F)  If a referral was made to an [facility] specialty provider and you do not get a call from central scheduling, please refer to directions on your visit summary or call our office during normal business hours for assistance.   If a Mammogram was ordered for you at the Breast Center call  [phone] to schedule or change your appointment.  If you had an XRay/CT/Ultrasound/MRI ordered the number is [phone] to schedule or change your radiology appointment.   [facility] has limited ultrasound appointments available on Wednesdays, if you would like your ultrasound at [facility], please call [phone] to schedule.   Medical Concerns:  If you have urgent medical concerns please call [phone] at any time of the day.    Dang Gomez, DO

## 2022-04-07 NOTE — PROGRESS NOTES
"  Assessment & Plan     Urinary frequency  - UA reflex to Microscopic and Culture  - UA reflex to Microscopic and Culture  - Urine Microscopic Exam  - Urine Culture    Acute cystitis with hematuria  - sulfamethoxazole-trimethoprim (BACTRIM DS) 800-160 MG tablet  Dispense: 6 tablet; Refill: 0    Symptoms consistent with uncomplicated UTI. UA with likely UTI. Will treat with Bactrim x 3 days (pt notified via MyChart as she had to leave for work before results returned). Ok to continue to use Azo at home as needed for discomfort.     No follow-ups on file.    Dang Gomez [facility] PAOLA Peña is a 41 year old who presents for the following health issues.    HPI     Patient presents with:  Urinary Problem: x 1 week, pt infroms to feel pressure on her bladder and when she urinates to feel pressure and sharp pain     Presents with 1 week of bladder/pelvic pain with urination. Has increased over the past week (slightly). SEvere pain. Feels like a \"needle in my urethra\".     +frequency & urgency. No hesitancy or hematuria.    H/o UTIs, usually manages at home with apple cider vinegar.   Yesterday, pain in R low back also - worried about kidney infection.     Review of Systems   Constitutional, HEENT, cardiovascular, pulmonary, gi and gu systems are negative, except as otherwise noted.      Objective    /84   Pulse 94   Temp 97.6  F (36.4  C) (Oral)   LMP 03/27/2022 (Within Days)   SpO2 97%   Breastfeeding No   There is no height or weight on file to calculate BMI.  Physical Exam  Constitutional:       General: She is not in acute distress.  HENT:      Head: Normocephalic and atraumatic.   Eyes:      Conjunctiva/sclera: Conjunctivae normal.   Pulmonary:      Effort: Pulmonary effort is normal.   Abdominal:      General: Abdomen is flat. There is no distension.      Palpations: Abdomen is soft.      Tenderness: There is no abdominal tenderness. There is no right CVA " tenderness, left CVA tenderness, guarding or rebound.   Musculoskeletal:      Cervical back: Neck supple.   Skin:     General: Skin is warm and dry.   Neurological:      Mental Status: She is alert and oriented to person, place, and time.   Psychiatric:         Judgment: Judgment normal.            Results for orders placed or performed in visit on 04/07/22 (from the past 24 hour(s))   UA reflex to Microscopic and Culture    Specimen: Urine, NOS   Result Value Ref Range    Color Urine Yellow Colorless, Straw, Light Yellow, Yellow    Appearance Urine Clear Clear    Glucose Urine Negative Negative mg/dL    Bilirubin Urine Negative Negative    Ketones Urine >=160 (A) Negative mg/dL    Specific Gravity Urine >=1.030 1.005 - 1.030    Blood Urine Large (A) Negative    pH Urine 5.5 5.0 - 8.0    Protein Albumin Urine >=300 (A) Negative mg/dL    Urobilinogen Urine 0.2 0.2, 1.0 E.U./dL    Nitrite Urine Positive (A) Negative    Leukocyte Esterase Urine Small (A) Negative   Urine Microscopic Exam   Result Value Ref Range    Bacteria Urine Moderate (A) None Seen /HPF    RBC Urine 10-25 (A) 0-2 /HPF /HPF    WBC Urine 10-25 (A) 0-5 /HPF /HPF    Squamous Epithelials Urine Few (A) None Seen /LPF    WBC Clumps Urine Present (A) None Seen /HPF

## 2022-04-10 LAB
BACTERIA UR CULT: ABNORMAL
BACTERIA UR CULT: ABNORMAL

## 2022-04-22 ENCOUNTER — VIRTUAL VISIT (OUTPATIENT)
Dept: PSYCHIATRY | Facility: CLINIC | Age: 42
End: 2022-04-22
Attending: NURSE PRACTITIONER
Payer: COMMERCIAL

## 2022-04-22 DIAGNOSIS — F33.1 MODERATE EPISODE OF RECURRENT MAJOR DEPRESSIVE DISORDER (H): ICD-10-CM

## 2022-04-22 PROCEDURE — 99443 PR PHYSICIAN TELEPHONE EVALUATION 21-30 MIN: CPT | Performed by: NURSE PRACTITIONER

## 2022-04-22 RX ORDER — VENLAFAXINE HYDROCHLORIDE 150 MG/1
CAPSULE, EXTENDED RELEASE ORAL
Qty: 30 CAPSULE | Refills: 2 | Status: SHIPPED | OUTPATIENT
Start: 2022-04-22 | End: 2022-08-17
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
## 2022-04-22 NOTE — PATIENT INSTRUCTIONS
**For crisis resources, please see the information at the end of this document**   Patient Education    Thank you for coming to the [facility] [place] [facility].    Lab Testing:  If you had lab testing today and your results are reassuring or normal they will be mailed to you or sent through [employer] within 7 days. If the lab tests need quick action we will call you with the results. The phone number we will call with results is # [phone]. If this is not the best number please call our clinic and change the number.     Medication Refills:  If you need any refills please call your pharmacy and they will contact us. Our fax number for refills is [phone]. Please allow three business days for refill processing.   If you need to change to a different pharmacy, please contact the new pharmacy directly. The new pharmacy will help you get your medications transferred.     Contact Us:  Please call [phone] during business hours (8-5:00 M-F).  If you have medication related questions after clinic hours, or on the weekend, please call [phone].    Financial Assistance [phone]  Medical Records [phone]       MENTAL HEALTH CRISIS RESOURCES:  For a emergency help, please call 911 or go to the nearest Emergency Department.     Emergency Walk-In Options:   EmPATH Unit @ [facility] ([place]): [phone] - Specialized mental health emergency area designed to be calming  [facility] ([place]): [phone]  [facility] ([place]): [phone]  [facility]): [phone]    County Crisis Information:   [place]: [phone]  [place]: [phone]  [place] ([place]) - Adult: [phone]     Child: [phone]  [place] - Adult: [phone]     Child: [phone]  [place]: [phone]  List of all [facility] resources:    https://[place].gov/dhs/people-we-serve/adults/health-care/mental-health/resources/crisis-contacts.jsp    National Crisis Information:   Crisis Text Line: Text  [place]  to 164507  National Suicide Prevention Lifeline: 6-684-436-TALK (1-820.899.2088)       For online chat options, visit https://suicidepreventionlifeline.org/chat/  Poison Control Center: 5-107-917-7882  Trans Lifeline: 5-962-694-9811 - Hotline for transgender people of all ages  The Herbert Project: 8-526-445-1174 - Hotline for LGBT youth     For Non-Emergency Support:   Fast Tracker: Mental Health & Substance Use Disorder Resources -   https://www.ison furnituren.org/

## 2022-06-20 ENCOUNTER — TELEPHONE (OUTPATIENT)
Dept: FAMILY MEDICINE | Facility: CLINIC | Age: 42
End: 2022-06-20

## 2022-06-20 DIAGNOSIS — Q30.9 ABNORMAL NASAL SEPTUM: Primary | ICD-10-CM

## 2022-06-20 NOTE — TELEPHONE ENCOUNTER
[facility] phone call message- patient requesting to speak with PCP or provider:    PCP: Katt Cheema    Additional Comments: Patient looking for referral to [facility] for evaluation of possible deviated septum. Will see you if necessary for referral.      Is a call back needed? Yes    Patient informed that it may take up to 2 business days to hear back from PCP:Yes    OK to leave a message on voice mail? Yes    Primary language: English      needed? No    Call taken on June 20, 2022 at 1:31 PM by Joao Mann

## 2022-06-23 NOTE — TELEPHONE ENCOUNTER
Followed upregarding referral, eft number for scheduling.    Ting Turner  Care Coordinator  [facility]  Phone:[phone]

## 2022-06-29 ENCOUNTER — VIRTUAL VISIT (OUTPATIENT)
Dept: PSYCHIATRY | Facility: CLINIC | Age: 42
End: 2022-06-29
Attending: NURSE PRACTITIONER
Payer: COMMERCIAL

## 2022-06-29 DIAGNOSIS — F33.1 MODERATE EPISODE OF RECURRENT MAJOR DEPRESSIVE DISORDER (H): Primary | ICD-10-CM

## 2022-06-29 PROCEDURE — 99442 PR PHYSICIAN TELEPHONE EVALUATION 11-20 MIN: CPT | Mod: 95 | Performed by: NURSE PRACTITIONER

## 2022-06-29 ASSESSMENT — PATIENT HEALTH QUESTIONNAIRE - PHQ9
10. IF YOU CHECKED OFF ANY PROBLEMS, HOW DIFFICULT HAVE THESE PROBLEMS MADE IT FOR YOU TO DO YOUR WORK, TAKE CARE OF THINGS AT HOME, OR GET ALONG WITH OTHER PEOPLE: SOMEWHAT DIFFICULT
SUM OF ALL RESPONSES TO PHQ QUESTIONS 1-9: 9
SUM OF ALL RESPONSES TO PHQ QUESTIONS 1-9: 9

## 2022-06-29 NOTE — PATIENT INSTRUCTIONS
**For crisis resources, please see the information at the end of this document**   Patient Education    Thank you for coming to the [facility] [place] [facility].     Lab Testing:  If you had lab testing today and your results are reassuring or normal they will be mailed to you or sent through [employer] within 7 days. If the lab tests need quick action we will call you with the results. The phone number we will call with results is # [phone]. If this is not the best number please call our clinic and change the number.     Medication Refills:  If you need any refills please call your pharmacy and they will contact us. Our fax number for refills is [phone].   Three business days of notice are needed for general medication refill requests.   Five business days of notice are needed for controlled substance refill requests.   If you need to change to a different pharmacy, please contact the new pharmacy directly. The new pharmacy will help you get your medications transferred.     Contact Us:  Please call [phone] during business hours (8-5:00 M-F).   If you have medication related questions after clinic hours, or on the weekend, please call [phone].     Financial Assistance [phone]   Medical Records [phone]       MENTAL HEALTH CRISIS RESOURCES:  For a emergency help, please call 911 or go to the nearest Emergency Department.     Emergency Walk-In Options:   EmPATH Unit @ [facility] ([place]): [phone] - Specialized mental health emergency area designed to be calming  [facility] ([place]): [phone]  [facility] ([place]): [phone]  [facility]): [phone]    [facility] Crisis Information:   [place]: [phone]  [place]: [phone]  [place] ([place]) - Adult: [phone]     Child: [phone]  [place] - Adult: [phone]     Child: [phone]  [place]:  [phone]  List of all [facility] resources:   https://[place].gov/dhs/people-we-serve/adults/health-care/mental-health/resources/crisis-contacts.jsp    National Crisis Information:   Crisis Text Line: Text  [place]  to 479949  National Suicide Prevention Lifeline: 9-883-563-TALK (1-599.871.7037)       For online chat options, visit https://suicidepreventionlifeline.org/chat/  Poison Control Center: 0-027-162-7343  Trans Lifeline: 8-136-996-0586 - Hotline for transgender people of all ages  The Herbert Project: 4-173-596-1956 - Hotline for LGBT youth     For Non-Emergency Support:   Fast Tracker: Mental Health & Substance Use Disorder Resources -   https://www.[employer].org/
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
## 2022-07-05 NOTE — TELEPHONE ENCOUNTER
FUTURE VISIT INFORMATION      FUTURE VISIT INFORMATION:    Date: 10/10/22    Time: 3PM    Location: [facility]  REFERRAL INFORMATION:    Referring provider:  Katt Cheema MD    Referring providers clinic:  [facility]    Reason for visit/diagnosis  referred by Katt Cheema MD in [facility], Abnormal nasal septum, records in Epic, per Pt.  Verified [facility] loaction    RECORDS REQUESTED FROM:       Clinic name Comments Records Status Imaging Status   [facility] 6/20/22 referral  Epic

## 2022-08-17 DIAGNOSIS — F33.1 MODERATE EPISODE OF RECURRENT MAJOR DEPRESSIVE DISORDER (H): ICD-10-CM

## 2022-08-17 NOTE — TELEPHONE ENCOUNTER
Last Seen: 6/29/22  RTC: 3 mo  Cancel: 1  No-Show: 0  Next Appt: 9/1/22  Incoming Refill From patient via phone call    Medication Requested: venlafaxine (EFFEXOR-XR) 150 MG 24 hr capsule  Directions: TAKE 1 CAPSULE BY MOUTH EVERY DAY  Qty: 30  Last Refill: 7/15/22 #30    Medication Refill Pended Per Refill Protocol   Message routed to Adamaris Hester (last plan - potential reduction to 112.5mg)

## 2022-08-17 NOTE — TELEPHONE ENCOUNTER
[facility] Call Center    Phone Message    May a detailed message be left on voicemail: yes     Reason for Call: Medication Refill Request    Has the patient contacted the pharmacy for the refill? Yes   Name of medication being requested: velafaxine  Provider who prescribed the medication: Mir  Pharmacy:    .  [facility] [id number] [place], [place] - [address]    Date medication is needed: ASAP - has one pill left.          Action Taken: Other: nursing pool    Travel Screening: Not Applicable

## 2022-08-19 RX ORDER — VENLAFAXINE HYDROCHLORIDE 150 MG/1
CAPSULE, EXTENDED RELEASE ORAL
Qty: 30 CAPSULE | Refills: 0 | Status: SHIPPED | OUTPATIENT
Start: 2022-08-19 | End: 2022-09-01

## 2022-08-19 NOTE — TELEPHONE ENCOUNTER
Patient following up on her med refills. Writer let her know that order is waiting for signature. Patient understood and requested an RN call once refill is sent through, she stated she is concerned because she is out of meds today.

## 2022-09-01 ENCOUNTER — VIRTUAL VISIT (OUTPATIENT)
Dept: PSYCHIATRY | Facility: CLINIC | Age: 42
End: 2022-09-01
Attending: NURSE PRACTITIONER
Payer: COMMERCIAL

## 2022-09-01 DIAGNOSIS — F33.1 MODERATE EPISODE OF RECURRENT MAJOR DEPRESSIVE DISORDER (H): ICD-10-CM

## 2022-09-01 PROCEDURE — 99213 OFFICE O/P EST LOW 20 MIN: CPT | Mod: 95 | Performed by: NURSE PRACTITIONER

## 2022-09-01 RX ORDER — VENLAFAXINE HYDROCHLORIDE 150 MG/1
CAPSULE, EXTENDED RELEASE ORAL
Qty: 30 CAPSULE | Refills: 1 | Status: SHIPPED | OUTPATIENT
Start: 2022-09-01 | End: 2022-10-03

## 2022-09-01 ASSESSMENT — PATIENT HEALTH QUESTIONNAIRE - PHQ9
10. IF YOU CHECKED OFF ANY PROBLEMS, HOW DIFFICULT HAVE THESE PROBLEMS MADE IT FOR YOU TO DO YOUR WORK, TAKE CARE OF THINGS AT HOME, OR GET ALONG WITH OTHER PEOPLE: SOMEWHAT DIFFICULT
SUM OF ALL RESPONSES TO PHQ QUESTIONS 1-9: 15
SUM OF ALL RESPONSES TO PHQ QUESTIONS 1-9: 15
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
## 2022-10-03 ENCOUNTER — VIRTUAL VISIT (OUTPATIENT)
Dept: PSYCHIATRY | Facility: CLINIC | Age: 42
End: 2022-10-03
Attending: NURSE PRACTITIONER
Payer: COMMERCIAL

## 2022-10-03 DIAGNOSIS — F33.1 MODERATE EPISODE OF RECURRENT MAJOR DEPRESSIVE DISORDER (H): ICD-10-CM

## 2022-10-03 PROCEDURE — 99213 OFFICE O/P EST LOW 20 MIN: CPT | Mod: 95 | Performed by: NURSE PRACTITIONER

## 2022-10-03 RX ORDER — VENLAFAXINE HYDROCHLORIDE 75 MG/1
CAPSULE, EXTENDED RELEASE ORAL
Qty: 30 CAPSULE | Refills: 1 | Status: SHIPPED | OUTPATIENT
Start: 2022-10-03 | End: 2022-11-07

## 2022-10-03 RX ORDER — VENLAFAXINE HYDROCHLORIDE 37.5 MG/1
37.5 CAPSULE, EXTENDED RELEASE ORAL DAILY
Qty: 30 CAPSULE | Refills: 1 | Status: SHIPPED | OUTPATIENT
Start: 2022-10-03 | End: 2022-11-07

## 2022-10-03 ASSESSMENT — PATIENT HEALTH QUESTIONNAIRE - PHQ9
SUM OF ALL RESPONSES TO PHQ QUESTIONS 1-9: 9
SUM OF ALL RESPONSES TO PHQ QUESTIONS 1-9: 9
10. IF YOU CHECKED OFF ANY PROBLEMS, HOW DIFFICULT HAVE THESE PROBLEMS MADE IT FOR YOU TO DO YOUR WORK, TAKE CARE OF THINGS AT HOME, OR GET ALONG WITH OTHER PEOPLE: SOMEWHAT DIFFICULT

## 2022-10-03 NOTE — PATIENT INSTRUCTIONS
**For crisis resources, please see the information at the end of this document**   Patient Education    Thank you for coming to the [facility] [place] [facility].    Lab Testing:  If you had lab testing today and your results are reassuring or normal they will be mailed to you or sent through [employer] within 7 days. If the lab tests need quick action we will call you with the results. The phone number we will call with results is # [phone]. If this is not the best number please call our clinic and change the number.     Medication Refills:  If you need any refills please call your pharmacy and they will contact us. Our fax number for refills is [phone]. Please allow three business days for refill processing.   If you need to change to a different pharmacy, please contact the new pharmacy directly. The new pharmacy will help you get your medications transferred.     Contact Us:  Please call [phone] during business hours (8-5:00 M-F).  If you have medication related questions after clinic hours, or on the weekend, please call [phone].    Financial Assistance [phone]  Medical Records [phone]       MENTAL HEALTH CRISIS RESOURCES:  For a emergency help, please call 911 or go to the nearest Emergency Department.     Emergency Walk-In Options:   EmPATH Unit @ [facility] ([place]): [phone] - Specialized mental health emergency area designed to be calming  [facility] ([place]): [phone]  [facility] ([place]): [phone]  [facility]): [phone]    County Crisis Information:   [place]: [phone]  [place]: [phone]  [place] ([place]) - Adult: [phone]     Child: [phone]  [place] - Adult: [phone]     Child: [phone]  [place]: [phone]  List of all [facility] resources:    https://[place].gov/dhs/people-we-serve/adults/health-care/mental-health/resources/crisis-contacts.jsp    National Crisis Information:   Crisis Text Line: Text  [place]  to 242807  National Suicide Prevention Lifeline: 9-392-082-TALK (1-754.426.6646)       For online chat options, visit https://suicidepreventionlifeline.org/chat/  Poison Control Center: 2-185-543-9266  Trans Lifeline: 2-468-619-6713 - Hotline for transgender people of all ages  [employer]: 1-791-027-8561 - Hotline for LGBT youth     For Non-Emergency Support:   Fast Tracker: Mental Health & Substance Use Disorder Resources -   https://www.[employer].org/
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
## 2022-10-10 ENCOUNTER — PRE VISIT (OUTPATIENT)
Dept: OTOLARYNGOLOGY | Facility: CLINIC | Age: 42
End: 2022-10-10

## 2022-10-10 ENCOUNTER — OFFICE VISIT (OUTPATIENT)
Dept: OTOLARYNGOLOGY | Facility: CLINIC | Age: 42
End: 2022-10-10
Payer: COMMERCIAL

## 2022-10-10 VITALS
SYSTOLIC BLOOD PRESSURE: 124 MMHG | HEIGHT: 65 IN | BODY MASS INDEX: 29.44 KG/M2 | HEART RATE: 75 BPM | DIASTOLIC BLOOD PRESSURE: 70 MMHG | WEIGHT: 176.7 LBS | OXYGEN SATURATION: 97 %

## 2022-10-10 DIAGNOSIS — J34.89 NASAL OBSTRUCTION: Primary | ICD-10-CM

## 2022-10-10 DIAGNOSIS — Q30.9 ABNORMAL NASAL SEPTUM: ICD-10-CM

## 2022-10-10 PROCEDURE — 99203 OFFICE O/P NEW LOW 30 MIN: CPT | Performed by: OTOLARYNGOLOGY

## 2022-10-10 RX ORDER — KETAMINE HCL IN 0.9 % NACL 200 MG/200
95 PLASTIC BAG, INJECTION (ML) INTRAVENOUS
COMMUNITY

## 2022-10-10 ASSESSMENT — PAIN SCALES - GENERAL: PAINLEVEL: NO PAIN (0)

## 2022-10-10 NOTE — PROGRESS NOTES
HISTORY OF PRESENT ILLNESS:  Mariana Mckeon is here to see us today for the first time.  She notes that she has had longstanding problems with breathing through her nose.  She states that the right side generally is worse, but that both sides can be affected.  She states that when she lies down at night, it tends to get worse.  She denies any recurrent episodes with sinusitis.  She does not report epistaxis.  No recent nasal trauma.  She has not used nasal steroid sprays in the past.    PAST MEDICAL HISTORY: Noted and reviewed in the chart.     FAMILY HISTORY: Noted and reviewed in the chart.     PAST SURGICAL HISTORY: Noted and reviewed in the chart.     SOCIAL HISTORY: Noted and reviewed in the chart.     REVIEW OF SYSTEMS:  Pertinent positives and negatives as above.  Otherwise, complete review of systems noted and reviewed in the chart.    PHYSICAL EXAMINATION:  This is a 42-year-old woman in no acute distress.  Normal mood and affect, normal ability to communicate.  Alert and appropriate.  Head is normocephalic.  Cranial nerve VII is House-Brackmann I/VI bilaterally.  Breathing without difficulty or stridor.  Eyes are anicteric.  Skin of the head and neck appears normal.    Examination of the nose shows some deviation of the septum to the right with bilateral inferior turbinate hypertrophy without polyps or purulence.  Examination of the ears show normal external auditory canals and tympanic membranes.  Examination of the mouth shows normal tongue, buccal mucosa and oropharynx.  Palpation of the neck shows no masses, tenderness or lymphadenopathy.    ASSESSMENT:  A 42-year-old with nasal obstruction and some turbinate hypertrophy and septal deviation.    PLAN:  At this point, we will do a trial of nasal steroids and follow up to discuss improvement in one month.

## 2022-10-10 NOTE — LETTER
10/10/2022     RE: Mariana Mckeon  [address]  [facility] [address]     Dear Colleague,    Thank you for referring your patient, Mariana Mckeon, to the [facility] EAR NOSE AND THROAT CLINIC [place] at [facility]. Please see a copy of my visit note below.    HISTORY OF PRESENT ILLNESS:  Mariana Mckeon is here to see us today for the first time.  She notes that she has had longstanding problems with breathing through her nose.  She states that the right side generally is worse, but that both sides can be affected.  She states that when she lies down at night, it tends to get worse.  She denies any recurrent episodes with sinusitis.  She does not report epistaxis.  No recent nasal trauma.  She has not used nasal steroid sprays in the past.    PAST MEDICAL HISTORY: Noted and reviewed in the chart.     FAMILY HISTORY: Noted and reviewed in the chart.     PAST SURGICAL HISTORY: Noted and reviewed in the chart.     SOCIAL HISTORY: Noted and reviewed in the chart.     REVIEW OF SYSTEMS:  Pertinent positives and negatives as above.  Otherwise, complete review of systems noted and reviewed in the chart.    PHYSICAL EXAMINATION:  This is a 42-year-old woman in no acute distress.  Normal mood and affect, normal ability to communicate.  Alert and appropriate.  Head is normocephalic.  Cranial nerve VII is House-Brackmann I/VI bilaterally.  Breathing without difficulty or stridor.  Eyes are anicteric.  Skin of the head and neck appears normal.    Examination of the nose shows some deviation of the septum to the right with bilateral inferior turbinate hypertrophy without polyps or purulence.  Examination of the ears show normal external auditory canals and tympanic membranes.  Examination of the mouth shows normal tongue, buccal mucosa and oropharynx.  Palpation of the neck shows no masses, tenderness or lymphadenopathy.    ASSESSMENT:  A 42-year-old with nasal  obstruction and some turbinate hypertrophy and septal deviation.    PLAN:  At this point, we will do a trial of nasal steroids and follow up to discuss improvement in one month.    Again, thank you for allowing me to participate in the care of your patient.      Sincerely,    Oswald Montes MD

## 2022-10-10 NOTE — NURSING NOTE
"Blood pressure 124/70, pulse 75, height 1.651 m (5' 5\"), weight 80.2 kg (176 lb 11.2 oz), SpO2 97 %, not currently breastfeeding.    Nhung Peters LPN    "

## 2022-10-10 NOTE — PATIENT INSTRUCTIONS
"You were seen in the clinic today by Dr. Montes. If you have any questions or concerns after your appointment, please call the clinic at [phone]. Press \"1\" for scheduling, press \"3\" for nurse advice.    2.   Plan to return the clinic in 1 month.       La Nena Ramírez LPN  [facility]  Department of Otolaryngology  [phone]       "

## 2022-10-11 ENCOUNTER — TELEPHONE (OUTPATIENT)
Dept: OTOLARYNGOLOGY | Facility: CLINIC | Age: 42
End: 2022-10-11

## 2022-10-11 NOTE — TELEPHONE ENCOUNTER
Central Prior Authorization Team   Phone: [phone]      PRIOR AUTHORIZATION DENIED    Medication: fluticasone (XHANCE) 93 MCG/ACT nasal spray - EPA DENIED     Denial Date: 10/11/2022    Denial Rational:   Coverage of the requested drug is provided when patients have tried two or more preferred chemically unique drugs within the same  drug class on the Preferred Drug List, or one preferred drug within the same drug class if there are not two chemically unique drugs  on the Preferred Drug List, AND the provider attests that the patient was adherent to previous therapies and allowed sufficient trial  time to evaluate efficacy or experienced an adverse effect, OR the patient is taking the non-preferred drug and experiencing both a  positive outcome and the provider attests that changing therapy could cause harm, OR the preferred medication is contraindicated or  would be likely to cause an adverse reaction, decrease ability to perform daily activities, or cause physical or mental harm, OR all  preferred drugs are experiencing a documented drug shortage or recall. Coverage is not provided for continuation of therapy if the  patient has not been taking the requested non-preferred drug for at least 90 days and is experiencing a positive therapeutic outcome.    Examples of formulary alternatives include:   azelastine nasal aerosol, spray 137 mcg (0.1 %),   azelastine nasal spray, non-aerosol 205.5 mcg (0.15 %),   fluticasone propionate nasal spray, suspension 50 mcg/actuation,   mometasone nasal spray, non-aerosol 50mcg/actuation and   ipratropium bromide nasal spray, non-aerosol 42 mcg (0.06 % and 0.03%).     Coverage cannot be authorized at this  time.          Appeal Information:

## 2022-11-07 ENCOUNTER — VIRTUAL VISIT (OUTPATIENT)
Dept: PSYCHIATRY | Facility: CLINIC | Age: 42
End: 2022-11-07
Attending: NURSE PRACTITIONER
Payer: COMMERCIAL

## 2022-11-07 DIAGNOSIS — F33.1 MODERATE EPISODE OF RECURRENT MAJOR DEPRESSIVE DISORDER (H): ICD-10-CM

## 2022-11-07 PROCEDURE — 99213 OFFICE O/P EST LOW 20 MIN: CPT | Mod: 95 | Performed by: NURSE PRACTITIONER

## 2022-11-07 RX ORDER — VENLAFAXINE HYDROCHLORIDE 37.5 MG/1
37.5 CAPSULE, EXTENDED RELEASE ORAL DAILY
Qty: 30 CAPSULE | Refills: 1 | Status: SHIPPED | OUTPATIENT
Start: 2022-11-07 | End: 2023-01-09

## 2022-11-07 RX ORDER — VENLAFAXINE HYDROCHLORIDE 75 MG/1
CAPSULE, EXTENDED RELEASE ORAL
Qty: 30 CAPSULE | Refills: 1 | Status: SHIPPED | OUTPATIENT
Start: 2022-11-07 | End: 2023-01-09
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
## 2022-11-07 NOTE — PATIENT INSTRUCTIONS
**For crisis resources, please see the information at the end of this document**   Patient Education    Thank you for coming to the [facility] [place] [facility].     Lab Testing:  If you had lab testing today and your results are reassuring or normal they will be mailed to you or sent through [employer] within 7 days. If the lab tests need quick action we will call you with the results. The phone number we will call with results is # [phone]. If this is not the best number please call our clinic and change the number.     Medication Refills:  If you need any refills please call your pharmacy and they will contact us. Our fax number for refills is [phone].   Three business days of notice are needed for general medication refill requests.   Five business days of notice are needed for controlled substance refill requests.   If you need to change to a different pharmacy, please contact the new pharmacy directly. The new pharmacy will help you get your medications transferred.     Contact Us:  Please call [phone] during business hours (8-5:00 M-F).   If you have medication related questions after clinic hours, or on the weekend, please call [phone].     Financial Assistance [phone]   Medical Records [phone]       MENTAL HEALTH CRISIS RESOURCES:  For a emergency help, please call 911 or go to the nearest Emergency Department.     Emergency Walk-In Options:   [facility] Unit @ [facility] ([place]): [phone] - Specialized mental health emergency area designed to be calming  [facility] ([place]): [phone]  [facility] ([place]): [phone]  [facility]): [phone]    [facility] Crisis Information:   [place]: [phone]  [place]: [phone]  [place] ([place]AL[place]) - Adult: [phone]     Child: [phone]  [place] - Adult: [phone]     Child: [phone]  [place]:  [phone]  List of all [facility] resources:   https://[place].gov/dhs/people-we-serve/adults/health-care/mental-health/resources/crisis-contacts.jsp    National Crisis Information:   Crisis Text Line: Text  [place]  to 196026  Suicide & Crisis Lifeline: 988  National Suicide Prevention Lifeline: 5-362-785-TALK (1-461.423.9375)       For online chat options, visit https://suicidepreventionlifeline.org/chat/  Poison Control Center: 2-700-157-3731  Trans Lifeline: 7-372-301-0821 - Hotline for transgender people of all ages  The [employer] Project: 1-906-072-0749 - Hotline for LGBT youth     For Non-Emergency Support:   Fast Tracker: Mental Health & Substance Use Disorder Resources -   https://www.FollicumckdoFormsn.org/

## 2022-11-17 ENCOUNTER — TRANSFERRED RECORDS (OUTPATIENT)
Dept: HEALTH INFORMATION MANAGEMENT | Facility: CLINIC | Age: 42
End: 2022-11-17

## 2022-11-21 ENCOUNTER — HEALTH MAINTENANCE LETTER (OUTPATIENT)
Age: 42
End: 2022-11-21

## 2023-01-09 ENCOUNTER — VIRTUAL VISIT (OUTPATIENT)
Dept: PSYCHIATRY | Facility: CLINIC | Age: 43
End: 2023-01-09
Attending: NURSE PRACTITIONER
Payer: COMMERCIAL

## 2023-01-09 DIAGNOSIS — F33.1 MODERATE EPISODE OF RECURRENT MAJOR DEPRESSIVE DISORDER (H): ICD-10-CM

## 2023-01-09 PROCEDURE — 99214 OFFICE O/P EST MOD 30 MIN: CPT | Mod: 95 | Performed by: NURSE PRACTITIONER

## 2023-01-09 RX ORDER — VENLAFAXINE HYDROCHLORIDE 75 MG/1
CAPSULE, EXTENDED RELEASE ORAL
Qty: 30 CAPSULE | Refills: 1 | Status: SHIPPED | OUTPATIENT
Start: 2023-01-09 | End: 2023-03-20

## 2023-01-09 RX ORDER — VENLAFAXINE HYDROCHLORIDE 37.5 MG/1
37.5 CAPSULE, EXTENDED RELEASE ORAL DAILY
Qty: 30 CAPSULE | Refills: 1 | Status: SHIPPED | OUTPATIENT
Start: 2023-01-09 | End: 2023-03-20
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
## 2023-01-09 NOTE — PATIENT INSTRUCTIONS
**For crisis resources, please see the information at the end of this document**   Patient Education    Thank you for coming to the [facility] [place] [facility].     Lab Testing:  If you had lab testing today and your results are reassuring or normal they will be mailed to you or sent through [employer] within 7 days. If the lab tests need quick action we will call you with the results. The phone number we will call with results is # [phone]. If this is not the best number please call our clinic and change the number.     Medication Refills:  If you need any refills please call your pharmacy and they will contact us. Our fax number for refills is [phone].   Three business days of notice are needed for general medication refill requests.   Five business days of notice are needed for controlled substance refill requests.   If you need to change to a different pharmacy, please contact the new pharmacy directly. The new pharmacy will help you get your medications transferred.     Contact Us:  Please call [phone] during business hours (8-5:00 M-F).   If you have medication related questions after clinic hours, or on the weekend, please call [phone].     Financial Assistance [phone]   Medical Records [phone]       MENTAL HEALTH CRISIS RESOURCES:  For a emergency help, please call 911 or go to the nearest Emergency Department.     Emergency Walk-In Options:   EmPATH Unit @ [facility] ([place]): [phone] - Specialized mental health emergency area designed to be calming  [facility] ([place]): [phone]  [facility] ([place]): [phone]  [facility]): [phone]    [facility] Crisis Information:   [place]: [phone]  [place]: [phone]  [place] ([place]) - Adult: [phone]     Child: [phone]  [place] - Adult: [phone]     Child: [phone]  [place]:  [phone]  List of all [facility] resources:   https://[place].gov/dhs/people-we-serve/adults/health-care/mental-health/resources/crisis-contacts.jsp    National Crisis Information:   Crisis Text Line: Text  [place]  to 455332  Suicide & Crisis Lifeline: 988  National Suicide Prevention Lifeline: 5-250-875-TALK (1-769.399.2360)       For online chat options, visit https://suicidepreventionlifeline.org/chat/  Poison Control Center: 5-267-504-6022  Trans Lifeline: 0-094-508-5196 - Hotline for transgender people of all ages  The [employer]: 3-903-690-1659 - Hotline for LGBT youth     For Non-Emergency Support:   Fast Tracker: Mental Health & Substance Use Disorder Resources -   https://www.[employer].org/

## 2023-03-20 ENCOUNTER — VIRTUAL VISIT (OUTPATIENT)
Dept: PSYCHIATRY | Facility: CLINIC | Age: 43
End: 2023-03-20
Attending: NURSE PRACTITIONER
Payer: COMMERCIAL

## 2023-03-20 DIAGNOSIS — F33.1 MODERATE EPISODE OF RECURRENT MAJOR DEPRESSIVE DISORDER (H): ICD-10-CM

## 2023-03-20 PROCEDURE — 99213 OFFICE O/P EST LOW 20 MIN: CPT | Mod: VID | Performed by: NURSE PRACTITIONER

## 2023-03-20 RX ORDER — VENLAFAXINE HYDROCHLORIDE 37.5 MG/1
37.5 CAPSULE, EXTENDED RELEASE ORAL DAILY
Qty: 30 CAPSULE | Refills: 1 | Status: SHIPPED | OUTPATIENT
Start: 2023-03-20 | End: 2023-04-17

## 2023-03-20 RX ORDER — VENLAFAXINE HYDROCHLORIDE 75 MG/1
CAPSULE, EXTENDED RELEASE ORAL
Qty: 30 CAPSULE | Refills: 1 | Status: SHIPPED | OUTPATIENT
Start: 2023-03-20 | End: 2023-04-17

## 2023-03-20 ASSESSMENT — PATIENT HEALTH QUESTIONNAIRE - PHQ9
10. IF YOU CHECKED OFF ANY PROBLEMS, HOW DIFFICULT HAVE THESE PROBLEMS MADE IT FOR YOU TO DO YOUR WORK, TAKE CARE OF THINGS AT HOME, OR GET ALONG WITH OTHER PEOPLE: NOT DIFFICULT AT ALL
SUM OF ALL RESPONSES TO PHQ QUESTIONS 1-9: 9
SUM OF ALL RESPONSES TO PHQ QUESTIONS 1-9: 9
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
## 2023-03-20 NOTE — NURSING NOTE
Is the patient currently in the state of [place]? YES    Visit mode:VIDEO    If the visit is dropped, the patient can be reconnected by: VIDEO VISIT: Text to cell phone: [phone]    Will anyone else be joining the visit? NO      How would you like to obtain your AVS? MyChart    Are changes needed to the allergy or medication list? NO    Reason for visit: Follow-up

## 2023-04-16 ENCOUNTER — HEALTH MAINTENANCE LETTER (OUTPATIENT)
Age: 43
End: 2023-04-16

## 2023-04-17 ENCOUNTER — VIRTUAL VISIT (OUTPATIENT)
Dept: PSYCHIATRY | Facility: CLINIC | Age: 43
End: 2023-04-17
Attending: NURSE PRACTITIONER
Payer: COMMERCIAL

## 2023-04-17 DIAGNOSIS — F33.1 MODERATE EPISODE OF RECURRENT MAJOR DEPRESSIVE DISORDER (H): ICD-10-CM

## 2023-04-17 PROCEDURE — 99213 OFFICE O/P EST LOW 20 MIN: CPT | Mod: VID | Performed by: NURSE PRACTITIONER

## 2023-04-17 RX ORDER — VENLAFAXINE HYDROCHLORIDE 75 MG/1
CAPSULE, EXTENDED RELEASE ORAL
Qty: 30 CAPSULE | Refills: 1 | Status: SHIPPED | OUTPATIENT
Start: 2023-04-17 | End: 2023-07-26
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
## 2023-05-12 ENCOUNTER — OFFICE VISIT (OUTPATIENT)
Dept: FAMILY MEDICINE | Facility: CLINIC | Age: 43
End: 2023-05-12
Payer: COMMERCIAL

## 2023-05-12 VITALS
OXYGEN SATURATION: 97 % | HEIGHT: 65 IN | WEIGHT: 175.4 LBS | DIASTOLIC BLOOD PRESSURE: 73 MMHG | TEMPERATURE: 97.3 F | BODY MASS INDEX: 29.22 KG/M2 | HEART RATE: 75 BPM | RESPIRATION RATE: 16 BRPM | SYSTOLIC BLOOD PRESSURE: 107 MMHG

## 2023-05-12 DIAGNOSIS — Z12.4 CERVICAL CANCER SCREENING: ICD-10-CM

## 2023-05-12 DIAGNOSIS — Z11.3 ROUTINE SCREENING FOR STI (SEXUALLY TRANSMITTED INFECTION): ICD-10-CM

## 2023-05-12 DIAGNOSIS — B00.9 HERPES SIMPLEX VIRUS INFECTION: ICD-10-CM

## 2023-05-12 DIAGNOSIS — Z23 HIGH PRIORITY FOR 2019-NCOV VACCINE: Primary | ICD-10-CM

## 2023-05-12 LAB
HIV 1+2 AB+HIV1 P24 AG SERPL QL IA: NONREACTIVE
T PALLIDUM AB SER QL: NONREACTIVE

## 2023-05-12 PROCEDURE — 88175 CYTOPATH C/V AUTO FLUID REDO: CPT | Performed by: FAMILY MEDICINE

## 2023-05-12 PROCEDURE — 87389 HIV-1 AG W/HIV-1&-2 AB AG IA: CPT | Performed by: FAMILY MEDICINE

## 2023-05-12 PROCEDURE — 36415 COLL VENOUS BLD VENIPUNCTURE: CPT | Performed by: FAMILY MEDICINE

## 2023-05-12 PROCEDURE — 99214 OFFICE O/P EST MOD 30 MIN: CPT | Mod: 25 | Performed by: FAMILY MEDICINE

## 2023-05-12 PROCEDURE — 86780 TREPONEMA PALLIDUM: CPT | Performed by: FAMILY MEDICINE

## 2023-05-12 PROCEDURE — 87624 HPV HI-RISK TYP POOLED RSLT: CPT | Performed by: FAMILY MEDICINE

## 2023-05-12 PROCEDURE — 0124A COVID-19 BIVALENT 12+ (PFIZER): CPT | Performed by: FAMILY MEDICINE

## 2023-05-12 PROCEDURE — 87491 CHLMYD TRACH DNA AMP PROBE: CPT | Performed by: FAMILY MEDICINE

## 2023-05-12 PROCEDURE — 91312 COVID-19 BIVALENT 12+ (PFIZER): CPT | Performed by: FAMILY MEDICINE

## 2023-05-12 PROCEDURE — 87591 N.GONORRHOEAE DNA AMP PROB: CPT | Performed by: FAMILY MEDICINE

## 2023-05-12 RX ORDER — VALACYCLOVIR HYDROCHLORIDE 500 MG/1
500 TABLET, FILM COATED ORAL 2 TIMES DAILY
Qty: 6 TABLET | Refills: 1 | Status: SHIPPED | OUTPATIENT
Start: 2023-05-12 | End: 2024-07-23

## 2023-05-12 ASSESSMENT — ANXIETY QUESTIONNAIRES
7. FEELING AFRAID AS IF SOMETHING AWFUL MIGHT HAPPEN: NOT AT ALL
3. WORRYING TOO MUCH ABOUT DIFFERENT THINGS: NOT AT ALL
2. NOT BEING ABLE TO STOP OR CONTROL WORRYING: NOT AT ALL
5. BEING SO RESTLESS THAT IT IS HARD TO SIT STILL: NOT AT ALL
6. BECOMING EASILY ANNOYED OR IRRITABLE: SEVERAL DAYS
IF YOU CHECKED OFF ANY PROBLEMS ON THIS QUESTIONNAIRE, HOW DIFFICULT HAVE THESE PROBLEMS MADE IT FOR YOU TO DO YOUR WORK, TAKE CARE OF THINGS AT HOME, OR GET ALONG WITH OTHER PEOPLE: SOMEWHAT DIFFICULT
1. FEELING NERVOUS, ANXIOUS, OR ON EDGE: NOT AT ALL
GAD7 TOTAL SCORE: 1
GAD7 TOTAL SCORE: 1

## 2023-05-12 ASSESSMENT — PATIENT HEALTH QUESTIONNAIRE - PHQ9
SUM OF ALL RESPONSES TO PHQ QUESTIONS 1-9: 4
5. POOR APPETITE OR OVEREATING: NOT AT ALL

## 2023-05-12 NOTE — PROGRESS NOTES
"  Assessment & Plan     High priority for 2019-nCoV vaccine  - COVID-19 BIVALENT 12+ (PFIZER)    Routine screening for STI (sexually transmitted infection)  - Treponema Abs w Reflex to RPR and Titer; Future  - HIV Antigen Antibody Combo; Future  - Neisseria gonorrhoeae PCR  - Chlamydia trachomatis PCR  - Treponema Abs w Reflex to RPR and Titer  - HIV Antigen Antibody Combo    Cervical cancer screening  - Pap screen with HPV - recommended age 30 - 65 years    Herpes simplex virus infection  Pt reports episodes of genital herpes like lesions, but has never sought care during outbreaks  Very painful and generally last a few weeks  ~3 outbreaks over the winter  Offered suppressive therapy, pt would like to have prn therapy available  Encouraged her to seek care if another outbreak occurs for diagnosis confirmation  - valACYclovir (VALTREX) 500 MG tablet; Take 1 tablet (500 mg) by mouth 2 times daily for 6 days      No follow-ups on file.    mEilee [facility] PAOLA Peña is a 42 year old, presenting for the following health issues:  Repeat Pap Smear (Pt is here for her pap smear and STD check) and Imm/Inj (COVID-19 VACCINE)        5/12/2023     9:44 AM   Additional Questions   Roomed by Mariana Capellan MA   Accompanied by Self         5/12/2023     9:44 AM   Patient Reported Additional Medications   Patient reports taking the following new medications No     HPI     Pt presenting for pap smear and STI screen    Review of Systems   Constitutional, HEENT, cardiovascular, pulmonary, gi and gu systems are negative, except as otherwise noted.      Objective    /73 (BP Location: Left arm, Patient Position: Sitting, Cuff Size: Adult Regular)   Pulse 75   Temp 97.3  F (36.3  C) (Oral)   Resp 16   Ht 1.655 m (5' 5.16\")   Wt 79.6 kg (175 lb 6.4 oz)   LMP 04/11/2023 (Approximate)   SpO2 97%   BMI 29.05 kg/m    Body mass index is 29.05 kg/m .  Physical Exam   GENERAL: " healthy, alert and no distress  RESP: lungs clear to auscultation - no rales, rhonchi or wheezes  CV: regular rate and rhythm, normal S1 S2, no S3 or S4, no murmur, click or rub, no peripheral edema and peripheral pulses strong   (female): normal female external genitalia, normal urethral meatus, vaginal mucosa, normal cervix/adnexa/uterus without masses or discharge  MS: no gross musculoskeletal defects noted, no edema

## 2023-05-13 LAB
C TRACH DNA SPEC QL NAA+PROBE: NEGATIVE
N GONORRHOEA DNA SPEC QL NAA+PROBE: NEGATIVE

## 2023-05-17 LAB
HUMAN PAPILLOMA VIRUS 16 DNA: NEGATIVE
HUMAN PAPILLOMA VIRUS 18 DNA: NEGATIVE
HUMAN PAPILLOMA VIRUS FINAL DIAGNOSIS: NORMAL
HUMAN PAPILLOMA VIRUS OTHER HR: NEGATIVE

## 2023-07-24 DIAGNOSIS — F33.1 MODERATE EPISODE OF RECURRENT MAJOR DEPRESSIVE DISORDER (H): ICD-10-CM

## 2023-07-26 RX ORDER — VENLAFAXINE HYDROCHLORIDE 75 MG/1
75 CAPSULE, EXTENDED RELEASE ORAL DAILY
Qty: 30 CAPSULE | Refills: 0 | Status: SHIPPED | OUTPATIENT
Start: 2023-07-26 | End: 2023-08-25

## 2023-07-26 NOTE — TELEPHONE ENCOUNTER
Medication requested: VENLAFAXINE HCL ER 75 MG CAP  Last refilled: 4/17/23  Qty: 30/1      Last seen: 4/17/23  RTC: 8 weeks  Cancel: x 1 on 6/16/23  No-show: 0  Next appt: 7/28/23    Refill decision:   Refill pended and routed to the provider for review/determination due to   Cancel x 1  scheduled for follow-up 7/28/23    Last note from 4/17/23 states.she chooses to reduce Effexor XR from 112.5mg to 75mg QAM -her desire to taper off venlafaxine

## 2023-08-25 ENCOUNTER — VIRTUAL VISIT (OUTPATIENT)
Dept: PSYCHIATRY | Facility: CLINIC | Age: 43
End: 2023-08-25
Attending: NURSE PRACTITIONER
Payer: COMMERCIAL

## 2023-08-25 DIAGNOSIS — F33.1 MODERATE EPISODE OF RECURRENT MAJOR DEPRESSIVE DISORDER (H): ICD-10-CM

## 2023-08-25 PROCEDURE — 99213 OFFICE O/P EST LOW 20 MIN: CPT | Mod: VID | Performed by: NURSE PRACTITIONER

## 2023-08-25 RX ORDER — VENLAFAXINE HYDROCHLORIDE 37.5 MG/1
37.5 CAPSULE, EXTENDED RELEASE ORAL DAILY
Qty: 30 CAPSULE | Refills: 1 | Status: SHIPPED | OUTPATIENT
Start: 2023-08-25 | End: 2023-10-31

## 2023-08-25 ASSESSMENT — PATIENT HEALTH QUESTIONNAIRE - PHQ9
10. IF YOU CHECKED OFF ANY PROBLEMS, HOW DIFFICULT HAVE THESE PROBLEMS MADE IT FOR YOU TO DO YOUR WORK, TAKE CARE OF THINGS AT HOME, OR GET ALONG WITH OTHER PEOPLE: SOMEWHAT DIFFICULT
SUM OF ALL RESPONSES TO PHQ QUESTIONS 1-9: 11
SUM OF ALL RESPONSES TO PHQ QUESTIONS 1-9: 11

## 2023-08-25 NOTE — NURSING NOTE
Is the patient currently in the state of [place]? YES    Visit mode:VIDEO    If the visit is dropped, the patient can be reconnected by: VIDEO VISIT: Text to cell phone:   Telephone Information:   Mobile [phone]       Will anyone else be joining the visit? NO  (If patient encounters technical issues they should call [phone])    How would you like to obtain your AVS? MyChart    Are changes needed to the allergy or medication list? Yes pt states the ketamine injection is 90 mg and she takes it every 3-4 weeks  and Pt stated no changes to allergies    Reason for visit: CODY Rojas VVF

## 2023-09-16 ENCOUNTER — HEALTH MAINTENANCE LETTER (OUTPATIENT)
Age: 43
End: 2023-09-16

## 2023-10-30 DIAGNOSIS — F33.1 MODERATE EPISODE OF RECURRENT MAJOR DEPRESSIVE DISORDER (H): ICD-10-CM

## 2023-10-31 RX ORDER — VENLAFAXINE HYDROCHLORIDE 37.5 MG/1
37.5 CAPSULE, EXTENDED RELEASE ORAL DAILY
Qty: 30 CAPSULE | Refills: 0 | Status: SHIPPED | OUTPATIENT
Start: 2023-10-31 | End: 2023-11-08

## 2023-10-31 NOTE — TELEPHONE ENCOUNTER
Medication requested: venlafaxine (EFFEXOR XR) 37.5 MG 24 hr capsule  Last refilled: 8/25/2023  Qty: 30/1       Last seen: 8/25/2023   RTC:   8 weeks, sooner as needed   Cancel: 0  No-show: 0  Next appt: 11/8/2023      Refill decision: Refilled for 30 days per protocol.

## 2023-11-08 ENCOUNTER — VIRTUAL VISIT (OUTPATIENT)
Dept: PSYCHIATRY | Facility: CLINIC | Age: 43
End: 2023-11-08
Attending: NURSE PRACTITIONER
Payer: COMMERCIAL

## 2023-11-08 DIAGNOSIS — F33.1 MODERATE EPISODE OF RECURRENT MAJOR DEPRESSIVE DISORDER (H): ICD-10-CM

## 2023-11-08 PROCEDURE — 99213 OFFICE O/P EST LOW 20 MIN: CPT | Mod: VID | Performed by: NURSE PRACTITIONER

## 2023-11-08 RX ORDER — VENLAFAXINE HYDROCHLORIDE 37.5 MG/1
37.5 CAPSULE, EXTENDED RELEASE ORAL DAILY
Qty: 30 CAPSULE | Refills: 1 | Status: SHIPPED | OUTPATIENT
Start: 2023-11-08 | End: 2024-01-05

## 2023-11-08 ASSESSMENT — PAIN SCALES - GENERAL: PAINLEVEL: NO PAIN (0)

## 2023-11-08 NOTE — NURSING NOTE
Is the patient currently in the state of [place]? YES    Visit mode:VIDEO    If the visit is dropped, the patient can be reconnected by: VIDEO VISIT: Text to cell phone:   Telephone Information:   Mobile [phone]       Will anyone else be joining the visit? NO  (If patient encounters technical issues they should call [phone]    How would you like to obtain your AVS? MyChart    Are changes needed to the allergy or medication list? No    Reason for visit: RECHECK    Hortensia GAINES
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
## 2023-11-30 ENCOUNTER — OFFICE VISIT (OUTPATIENT)
Dept: FAMILY MEDICINE | Facility: CLINIC | Age: 43
End: 2023-11-30
Payer: COMMERCIAL

## 2023-11-30 VITALS
RESPIRATION RATE: 16 BRPM | OXYGEN SATURATION: 98 % | WEIGHT: 175 LBS | TEMPERATURE: 98 F | SYSTOLIC BLOOD PRESSURE: 130 MMHG | HEIGHT: 65 IN | HEART RATE: 65 BPM | BODY MASS INDEX: 29.16 KG/M2 | DIASTOLIC BLOOD PRESSURE: 80 MMHG

## 2023-11-30 DIAGNOSIS — F48.9 MENTAL HEALTH PROBLEM: Primary | ICD-10-CM

## 2023-11-30 DIAGNOSIS — Z11.3 SCREENING FOR STDS (SEXUALLY TRANSMITTED DISEASES): ICD-10-CM

## 2023-11-30 LAB — T VAGINALIS DNA SPEC QL NAA+PROBE: NOT DETECTED

## 2023-11-30 PROCEDURE — 87591 N.GONORRHOEAE DNA AMP PROB: CPT | Performed by: FAMILY MEDICINE

## 2023-11-30 PROCEDURE — 99213 OFFICE O/P EST LOW 20 MIN: CPT | Performed by: FAMILY MEDICINE

## 2023-11-30 PROCEDURE — 86803 HEPATITIS C AB TEST: CPT | Performed by: FAMILY MEDICINE

## 2023-11-30 PROCEDURE — 87491 CHLMYD TRACH DNA AMP PROBE: CPT | Performed by: FAMILY MEDICINE

## 2023-11-30 PROCEDURE — 86780 TREPONEMA PALLIDUM: CPT | Performed by: FAMILY MEDICINE

## 2023-11-30 PROCEDURE — 87340 HEPATITIS B SURFACE AG IA: CPT | Performed by: FAMILY MEDICINE

## 2023-11-30 PROCEDURE — 87389 HIV-1 AG W/HIV-1&-2 AB AG IA: CPT | Performed by: FAMILY MEDICINE

## 2023-11-30 PROCEDURE — 36415 COLL VENOUS BLD VENIPUNCTURE: CPT | Performed by: FAMILY MEDICINE

## 2023-11-30 PROCEDURE — 87661 TRICHOMONAS VAGINALIS AMPLIF: CPT | Performed by: FAMILY MEDICINE

## 2023-11-30 ASSESSMENT — PATIENT HEALTH QUESTIONNAIRE - PHQ9: SUM OF ALL RESPONSES TO PHQ QUESTIONS 1-9: 6

## 2023-11-30 NOTE — PATIENT INSTRUCTIONS
Patient Education   Here is the plan from today's visit    1. Mental health problem  Send me a Vpon message with documents of Autism and ADHD assessment attached.     2. Screening for STDs (sexually transmitted diseases)  - Neisseria gonorrhoeae PCR; Future - Throat  - Chlamydia trachomatis PCR; Future- Throat  - Chlamydia trachomatis/Neisseria gonorrhoeae by PCR - Rectum (self-swab)  - Chlamydia trachomatis/Neisseria gonorrhoeae by PCR - Throat  - Treponema Abs w Reflex to RPR and Titer  - HIV Antigen Antibody Combo  - Trichomonas vaginalis DNA PCR  - Hepatitis B surface antigen  - Hepatitis C Screen Reflex to HCV RNA Quant and Genotype    Thank you for coming to [facility] today.  Lab Testing:  **If you had lab testing today and your results are reassuring or normal they will be mailed to you or sent through [employer] within 7 days.   **If the lab tests need quick action we will call you with the results.  **If you are having labs done on a different day, please call [phone] to schedule at [facility] or [phone] for other [facility] Outpatient Lab locations. Labs do not offer walk-in appointments.  The phone number we will call with results is # [phone] (home) . If this is not the best number please call our clinic and change the number.  Medication Refills:  If you need any refills please call your pharmacy and they will contact us.   If you need to  your refill at a new pharmacy, please contact the new pharmacy directly. The new pharmacy will help you get your medications transferred faster.   Scheduling:  If you have any concerns about today's visit or wish to schedule another appointment please call our office during normal business hours [phone] (8-5:00 M-F). If you can no longer make a scheduled visit, please cancel via [employer] or call us to cancel.   If a referral was made to an [facility] specialty provider and you do not get a call from central scheduling,  please refer to directions on your visit summary or call our office during normal business hours for assistance.   If a Mammogram was ordered for you at the Breast Center call [phone] to schedule or change your appointment.  If you had an XRay/CT/Ultrasound/MRI ordered the number is [phone] to schedule or change your radiology appointment.   [facility] has limited ultrasound appointments available on Wednesdays, if you would like your ultrasound at [facility], please call [phone] to schedule.   Medical Concerns:  If you have urgent medical concerns please call [phone] at any time of the day.    Katt Cheema MD

## 2023-11-30 NOTE — PROGRESS NOTES
"  Assessment & Plan       1. Mental health problem  Send me a [employer] message with documents of Autism and ADHD assessment attached.     Medical Records updated with   Malaria Hx, Surgical histories    2. Screening for STDs (sexually transmitted diseases)  - Neisseria gonorrhoeae PCR; Future - Urine  - Chlamydia trachomatis PCR; Future- Urine  - Chlamydia trachomatis/Neisseria gonorrhoeae by PCR - Rectum (self-swab)  - Chlamydia trachomatis/Neisseria gonorrhoeae by PCR - Throat  - Treponema Abs w Reflex to RPR and Titer  - HIV Antigen Antibody Combo  - Trichomonas vaginalis DNA PCR  - Hepatitis B surface antigen  - Hepatitis C Screen Reflex to HCV RNA Quant and Genotype          I spent a total of 28 minutes on the day of the visit.   Time spent by me doing chart review, history and exam, documentation and further activities per the note       BMI:   Estimated body mass index is 29.3 kg/m  as calculated from the following:    Height as of this encounter: 1.646 m (5' 4.8\").    Weight as of this encounter: 79.4 kg (175 lb).           No follow-ups on file.    Katt Cheema MD  [facility] PAOLA Peña is a 43 year old, presenting for the following health issues:  RECHECK (ADHD diagnosis )      11/30/2023     3:27 PM   Additional Questions   Roomed by eamon   Accompanied by self       HPI             New autism diagnosis  Nov 2022 got ADHD diagnosis  Aug 2023 Autism diagnosis  Explains a lot of her history of depression and social anxiety  Wants added to her chart in case accomodations needed in the future.   Currently job going well, doesn't need formal accomodation  Wears sunglasses  Shows up as a processing disorder, fluroescent lights, ear plugs, competing soundshard to differentiate  Everything is a PTSD reaction  Chemical and perfume sensitivities.     Malaria when in [facility] 20 years ago, treated in [facility]. Could that still effect her?  Level of tiredness - autistic " Detail Level: Generalized "fatigue      Cervical cancer screening - May 2024    Desires STI screening. Partner of 2 years, unprotected sex.       Review of Systems         Objective    /80 (BP Location: Left arm, Patient Position: Sitting, Cuff Size: Adult Regular)   Pulse 65   Temp 98  F (36.7  C)   Resp 16   Ht 1.646 m (5' 4.8\")   Wt 79.4 kg (175 lb)   LMP 11/21/2023   SpO2 98%   BMI 29.30 kg/m    Body mass index is 29.3 kg/m .  Physical Exam                       " Detail Level: Zone Detail Level: Simple

## 2023-12-01 LAB
C TRACH DNA SPEC QL NAA+PROBE: NEGATIVE
C TRACH DNA SPEC QL PROBE+SIG AMP: NEGATIVE
C TRACH DNA SPEC QL PROBE+SIG AMP: NEGATIVE
HBV SURFACE AG SERPL QL IA: NONREACTIVE
HCV AB SERPL QL IA: NONREACTIVE
HIV 1+2 AB+HIV1 P24 AG SERPL QL IA: NONREACTIVE
N GONORRHOEA DNA SPEC QL NAA+PROBE: NEGATIVE
T PALLIDUM AB SER QL: NONREACTIVE

## 2023-12-04 PROBLEM — F84.0 AUTISM SPECTRUM DISORDER WITHOUT ACCOMPANYING INTELLECTUAL IMPAIRMENT, REQUIRING SUPPORT (LEVEL 1): Status: ACTIVE | Noted: 2023-12-04

## 2023-12-04 NOTE — RESULT ENCOUNTER NOTE
Mariana,   All of your STI testing is negative. This is normal and reassuring. If you have any further questions feel free to contact me via [employer] message.     Sincerely,   Katt Cheema MD

## 2024-01-05 ENCOUNTER — VIRTUAL VISIT (OUTPATIENT)
Dept: PSYCHIATRY | Facility: CLINIC | Age: 44
End: 2024-01-05
Attending: NURSE PRACTITIONER
Payer: COMMERCIAL

## 2024-01-05 DIAGNOSIS — F33.1 MODERATE EPISODE OF RECURRENT MAJOR DEPRESSIVE DISORDER (H): ICD-10-CM

## 2024-01-05 PROCEDURE — 99213 OFFICE O/P EST LOW 20 MIN: CPT | Mod: 95 | Performed by: NURSE PRACTITIONER

## 2024-01-05 RX ORDER — VENLAFAXINE HYDROCHLORIDE 37.5 MG/1
37.5 CAPSULE, EXTENDED RELEASE ORAL DAILY
Qty: 30 CAPSULE | Refills: 2 | Status: SHIPPED | OUTPATIENT
Start: 2024-01-05 | End: 2024-04-22

## 2024-01-05 ASSESSMENT — PATIENT HEALTH QUESTIONNAIRE - PHQ9
SUM OF ALL RESPONSES TO PHQ QUESTIONS 1-9: 2
SUM OF ALL RESPONSES TO PHQ QUESTIONS 1-9: 2
10. IF YOU CHECKED OFF ANY PROBLEMS, HOW DIFFICULT HAVE THESE PROBLEMS MADE IT FOR YOU TO DO YOUR WORK, TAKE CARE OF THINGS AT HOME, OR GET ALONG WITH OTHER PEOPLE: SOMEWHAT DIFFICULT

## 2024-01-05 NOTE — NURSING NOTE
Is the patient currently in the state of [place]? YES    Visit mode:VIDEO    If the visit is dropped, the patient can be reconnected by: VIDEO VISIT: Text to cell phone:   Telephone Information:   Mobile [phone]       Will anyone else be joining the visit? NO  (If patient encounters technical issues they should call [phone]    How would you like to obtain your AVS? MyChart    Are changes needed to the allergy or medication list? No    Reason for visit: No chief complaint on file.    Ladonna VARGASF
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
## 2024-04-04 ENCOUNTER — TELEPHONE (OUTPATIENT)
Dept: FAMILY MEDICINE | Facility: CLINIC | Age: 44
End: 2024-04-04
Payer: COMMERCIAL

## 2024-04-04 DIAGNOSIS — R87.610 ASCUS WITH POSITIVE HIGH RISK HPV CERVICAL: Primary | ICD-10-CM

## 2024-04-04 DIAGNOSIS — R87.810 ASCUS WITH POSITIVE HIGH RISK HPV CERVICAL: Primary | ICD-10-CM

## 2024-04-04 NOTE — TELEPHONE ENCOUNTER
LVM for Mariana regarding scheduling upcoming cervical cancer screening that is due.  Asked to call CC back directly to schedule.  Will try back in 2wks.  Emilee GLASS, KARIM, OB/Reproductive Health CC

## 2024-04-20 DIAGNOSIS — F33.1 MODERATE EPISODE OF RECURRENT MAJOR DEPRESSIVE DISORDER (H): ICD-10-CM

## 2024-04-22 RX ORDER — VENLAFAXINE HYDROCHLORIDE 37.5 MG/1
37.5 CAPSULE, EXTENDED RELEASE ORAL DAILY
Qty: 30 CAPSULE | Refills: 0 | Status: SHIPPED | OUTPATIENT
Start: 2024-04-22 | End: 2024-05-29

## 2024-04-22 NOTE — TELEPHONE ENCOUNTER
Date of Last Office Visit: 1/5/2024  [facility]     Adamaris Hester APRN CNP     RTC: 12 wks  No shows: 0  Cancellations: x1 - 4/12  Date of Next Office Visit: 0  ------------------------------  Medication requested:    venlafaxine (EFFEXOR XR) 37.5 MG 24 hr capsule 30 capsule 2 1/5/2024 -- No   Sig - Route: Take 1 capsule (37.5 mg) by mouth daily - Oral   Last refill: 3/24/2024   ------------------------------      Refill decision: Refill pended and routed to the provider for review/determination due to the following criteria not met: Due for RTC and cancel x1  Scheduling has been notified to contact the pt for appointment.      Medication unable to be refilled by RN due to criteria not met as indicated.                 []Eligibility - not seen in the last year              [x]Supervision - no future appointment              [x]Compliance - no shows, cancellations or lapse in therapy              []Verification - order discrepancy              []Controlled medication              []Medication not included in policy              []90-day supply request              []Other:

## 2024-04-24 ENCOUNTER — TELEPHONE (OUTPATIENT)
Dept: FAMILY MEDICINE | Facility: CLINIC | Age: 44
End: 2024-04-24
Payer: COMMERCIAL

## 2024-04-24 NOTE — TELEPHONE ENCOUNTER
Reached Mariana. 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  Emilee GLASS CNM, OB/Reproductive Health CC

## 2024-05-17 ENCOUNTER — TELEPHONE (OUTPATIENT)
Dept: FAMILY MEDICINE | Facility: CLINIC | Age: 44
End: 2024-05-17
Payer: COMMERCIAL

## 2024-05-17 DIAGNOSIS — R87.610 ASCUS WITH POSITIVE HIGH RISK HPV CERVICAL: Primary | ICD-10-CM

## 2024-05-17 DIAGNOSIS — R87.810 ASCUS WITH POSITIVE HIGH RISK HPV CERVICAL: Primary | ICD-10-CM

## 2024-05-17 NOTE — TELEPHONE ENCOUNTER
LVM for Mariana regarding scheduling her recommended testing (cotest-cervical cancer screening).  Asked to call CC back directly.  [employer] message sent. Next reminder in 1month and will send letter at that time.  Emilee GLASS CNM, OB/Reproductive Health CC

## 2024-05-29 DIAGNOSIS — F33.1 MODERATE EPISODE OF RECURRENT MAJOR DEPRESSIVE DISORDER (H): ICD-10-CM

## 2024-05-29 RX ORDER — VENLAFAXINE HYDROCHLORIDE 37.5 MG/1
37.5 CAPSULE, EXTENDED RELEASE ORAL DAILY
Qty: 30 CAPSULE | Refills: 0 | Status: SHIPPED | OUTPATIENT
Start: 2024-05-29 | End: 2024-05-31

## 2024-05-29 NOTE — TELEPHONE ENCOUNTER
Last seen: 5/31  RTC: 12 weeks   Cancel: 4/12, 4/29  No-show: none   Next appt: 1/5    Incoming refill from patient via phone      Disp Refills Start End KYRSTYNA    venlafaxine (EFFEXOR XR) 37.5 MG 24 hr capsule 30 capsule 0 4/22/2024 -- No   Sig - Route: Take 1 capsule (37.5 mg) by mouth daily **Please schedule follow up appt for more refills** - Oral   Sent to pharmacy as: Venlafaxine HCl ER 37.5 MG Oral Capsule Extended Release 24 Hour (EFFEXOR XR)   Class: E-Prescribe   Order: [id number]   E-Prescribing Status: Receipt confirmed by pharmacy (4/22/2024  4:30 PM CDT)       Last Visit Treatment Plan     1) PSYCHOTROPIC MEDICATIONS:  1) today, she chooses to continue venlafaxine XR 37.5mg QAM     Refill decision: Refill pended and routed to the provider for review/determination due to the following criteria not met:     Medication unable to be refilled by RN due to criteria not met as indicated.                 []Eligibility - not seen in the last year              []Supervision - no future appointment              []Compliance - no shows, cancellations or lapse in therapy              []Verification - order discrepancy              []Controlled medication              []Medication not included in policy              []90-day supply request              [x]Other:

## 2024-05-29 NOTE — TELEPHONE ENCOUNTER
"Date of Last Office Visit: 1/5/24 - Mir  RTC: 12 weeks  No shows: 0  Cancellations: x2 4/12, 4/29  Date of Next Office Visit: 5/31/24  ------------------------------    Incoming refill from Pharmacy via interface  Medication requested:    venlafaxine (EFFEXOR XR) 37.5 MG 24 hr capsule 30 capsule 0 4/22/2024 -- No   Sig - Route: Take 1 capsule (37.5 mg) by mouth daily **Please schedule follow up appt for more refills**     ------------------------------  From last visit note:   \" chooses to continue venlafaxine XR 37.5mg QAM \"       Refill decision: Refill pended and routed to the provider for review/determination due to the following criteria not met: cancellation x2 - appt 5/31    Medication unable to be refilled by RN due to criteria not met as indicated.                 []Eligibility - not seen in the last year              []Supervision - no future appointment              [x]Compliance - no shows, cancellations or lapse in therapy              []Verification - order discrepancy              []Controlled medication              []Medication not included in policy              []90-day supply request              []Other:                        "

## 2024-05-29 NOTE — TELEPHONE ENCOUNTER
[facility] Call Center    Phone Message    May a detailed message be left on voicemail: yes     Reason for Call: Medication Refill Request    Has the patient contacted the pharmacy for the refill? Yes   Name of medication being requested: venlafaxine  Provider who prescribed the medication: karen  Pharmacy: [facility] [id number] [place], [place] - [facility]  Date medication is needed: patient has run out of this medication, scheduled for follow up this Friday    Action Taken: Message routed to:  Other: nursing pool    Travel Screening: Not Applicable

## 2024-05-31 ENCOUNTER — VIRTUAL VISIT (OUTPATIENT)
Dept: PSYCHIATRY | Facility: CLINIC | Age: 44
End: 2024-05-31
Attending: NURSE PRACTITIONER
Payer: COMMERCIAL

## 2024-05-31 ENCOUNTER — TELEPHONE (OUTPATIENT)
Dept: PSYCHIATRY | Facility: CLINIC | Age: 44
End: 2024-05-31
Payer: COMMERCIAL

## 2024-05-31 DIAGNOSIS — F33.1 MODERATE EPISODE OF RECURRENT MAJOR DEPRESSIVE DISORDER (H): ICD-10-CM

## 2024-05-31 PROCEDURE — 99214 OFFICE O/P EST MOD 30 MIN: CPT | Mod: 95 | Performed by: NURSE PRACTITIONER

## 2024-05-31 RX ORDER — VENLAFAXINE HYDROCHLORIDE 37.5 MG/1
37.5 CAPSULE, EXTENDED RELEASE ORAL DAILY
Qty: 30 CAPSULE | Refills: 1 | OUTPATIENT
Start: 2024-05-31

## 2024-05-31 RX ORDER — VENLAFAXINE HYDROCHLORIDE 37.5 MG/1
37.5 CAPSULE, EXTENDED RELEASE ORAL DAILY
Qty: 30 CAPSULE | Refills: 1 | Status: SHIPPED | OUTPATIENT
Start: 2024-05-31 | End: 2024-07-24

## 2024-05-31 ASSESSMENT — PAIN SCALES - GENERAL: PAINLEVEL: NO PAIN (0)

## 2024-05-31 ASSESSMENT — PATIENT HEALTH QUESTIONNAIRE - PHQ9
SUM OF ALL RESPONSES TO PHQ QUESTIONS 1-9: 11
SUM OF ALL RESPONSES TO PHQ QUESTIONS 1-9: 11
10. IF YOU CHECKED OFF ANY PROBLEMS, HOW DIFFICULT HAVE THESE PROBLEMS MADE IT FOR YOU TO DO YOUR WORK, TAKE CARE OF THINGS AT HOME, OR GET ALONG WITH OTHER PEOPLE: SOMEWHAT DIFFICULT

## 2024-05-31 NOTE — TELEPHONE ENCOUNTER
Writer spoke with patients pharmacy and confirmed she did have a prescription available and they would work on filling it now.  Patient notified.
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
## 2024-05-31 NOTE — TELEPHONE ENCOUNTER
[facility] Call Center    Phone Message    May a detailed message be left on voicemail: yes     Reason for Call: Medication Refill Request    Has the patient contacted the pharmacy for the refill? Yes   Name of medication being requested: Effexor  Provider who prescribed the medication: Adamaris Hester  Pharmacy: [facility] [id number] [place], [place] - [address]   Date medication is needed: ASAP, patient out    Patient would appreciate a call from RN about status of refills, she is completely out and wants to make sure she can  meds for the weekend.    Action Taken: Message routed to:  Other: P PSYCHIATRY NURSE-[facility]    Travel Screening: Not Applicable

## 2024-05-31 NOTE — NURSING NOTE
Is the patient currently in the state of [place]? YES    Visit mode:VIDEO    If the visit is dropped, the patient can be reconnected by: VIDEO VISIT: Text to cell phone:   Telephone Information:   Mobile [phone]       Will anyone else be joining the visit? NO  (If patient encounters technical issues they should call [phone]    How would you like to obtain your AVS? MyChart    Are changes needed to the allergy or medication list? No    Are refills needed on medications prescribed by this physician? NO    Reason for visit: RECHECK    Hortensia GAINES

## 2024-06-22 ENCOUNTER — HEALTH MAINTENANCE LETTER (OUTPATIENT)
Age: 44
End: 2024-06-22

## 2024-07-23 ENCOUNTER — OFFICE VISIT (OUTPATIENT)
Dept: FAMILY MEDICINE | Facility: CLINIC | Age: 44
End: 2024-07-23
Payer: COMMERCIAL

## 2024-07-23 VITALS
OXYGEN SATURATION: 96 % | DIASTOLIC BLOOD PRESSURE: 66 MMHG | BODY MASS INDEX: 29.61 KG/M2 | HEIGHT: 65 IN | TEMPERATURE: 98.3 F | SYSTOLIC BLOOD PRESSURE: 117 MMHG | HEART RATE: 75 BPM | RESPIRATION RATE: 17 BRPM | WEIGHT: 177.7 LBS

## 2024-07-23 DIAGNOSIS — Z30.012 ENCOUNTER FOR EMERGENCY CONTRACEPTION: ICD-10-CM

## 2024-07-23 DIAGNOSIS — K62.89 NODULE OF ANUS: ICD-10-CM

## 2024-07-23 DIAGNOSIS — Z12.11 COLON CANCER SCREENING: ICD-10-CM

## 2024-07-23 DIAGNOSIS — B00.9 HERPES SIMPLEX VIRUS INFECTION: ICD-10-CM

## 2024-07-23 DIAGNOSIS — Z12.31 ENCOUNTER FOR SCREENING MAMMOGRAM FOR BREAST CANCER: ICD-10-CM

## 2024-07-23 DIAGNOSIS — R87.610 ASCUS WITH POSITIVE HIGH RISK HPV CERVICAL: Primary | ICD-10-CM

## 2024-07-23 DIAGNOSIS — R87.810 ASCUS WITH POSITIVE HIGH RISK HPV CERVICAL: Primary | ICD-10-CM

## 2024-07-23 PROCEDURE — 90715 TDAP VACCINE 7 YRS/> IM: CPT | Performed by: FAMILY MEDICINE

## 2024-07-23 PROCEDURE — 87624 HPV HI-RISK TYP POOLED RSLT: CPT | Performed by: FAMILY MEDICINE

## 2024-07-23 PROCEDURE — G0145 SCR C/V CYTO,THINLAYER,RESCR: HCPCS | Performed by: FAMILY MEDICINE

## 2024-07-23 PROCEDURE — 90471 IMMUNIZATION ADMIN: CPT | Performed by: FAMILY MEDICINE

## 2024-07-23 PROCEDURE — 99215 OFFICE O/P EST HI 40 MIN: CPT | Mod: 25 | Performed by: FAMILY MEDICINE

## 2024-07-23 RX ORDER — VALACYCLOVIR HYDROCHLORIDE 500 MG/1
500 TABLET, FILM COATED ORAL 2 TIMES DAILY
Qty: 6 TABLET | Refills: 3 | Status: SHIPPED | OUTPATIENT
Start: 2024-07-23 | End: 2024-07-26

## 2024-07-23 RX ORDER — VALACYCLOVIR HYDROCHLORIDE 500 MG/1
500 TABLET, FILM COATED ORAL DAILY
Qty: 90 TABLET | Refills: 3 | Status: SHIPPED | OUTPATIENT
Start: 2024-07-23

## 2024-07-23 ASSESSMENT — PATIENT HEALTH QUESTIONNAIRE - PHQ9
SUM OF ALL RESPONSES TO PHQ QUESTIONS 1-9: 13
10. IF YOU CHECKED OFF ANY PROBLEMS, HOW DIFFICULT HAVE THESE PROBLEMS MADE IT FOR YOU TO DO YOUR WORK, TAKE CARE OF THINGS AT HOME, OR GET ALONG WITH OTHER PEOPLE: SOMEWHAT DIFFICULT
SUM OF ALL RESPONSES TO PHQ QUESTIONS 1-9: 13

## 2024-07-23 NOTE — PROGRESS NOTES
Assessment & Plan     # Abnormal pelvic sensation after unprotected sex  Discussed could have been very early miscarriage (had period on time, but was darker than usual), ovulation pain, other nonspecific nonpathologic pelvic pain. Given LMP 3 weeks ago (and since the encounter) pregnancy unlikely, although if she does not have another menses in the next 1-2 weeks she should take a pregnancy test.     # Encounter for emergency contraception  - Ulipristal Acetate (TRACIE) 30 MG tablet; Take 1 tablet (30 mg) by mouth once for 1 dose  Dispense: 1 tablet; Refill: 11    #Anal nodule  Not visible, only palpable, <5mm, nontender  Ddx: hemmorhoid (very small/mild), anal intraepithelial lesion, lipoma  Pt concerned it's a chronic HSV lesion, unlikely given no pain or skin breaking/vesicles  Unlikely perianal abscess because no erythema or pain  - plan to monitor    # Perimenopause questions  She is still getting monthly periods, maybe very early stages of perimenopause. Discussed that menopause is 12 months of no period, and no indication for hormonal testing. Meds can be helpful for symptom management of hot flashes and vaginal atrophy/dryness/irritation - but that symptoms of fatigue, brain fog, mood changes, weight gain - those all best managed with lifestyle interventions (nutrition and physical activity). Additional written info provided.     # Herpes simplex virus infection  Has more than 2 outbreaks per year, plan to start suppressive therapy  -     valACYclovir (VALTREX) 500 MG tablet; Take 1 tablet (500 mg) by mouth daily  -     valACYclovir (VALTREX) 500 MG tablet; Take 1 tablet (500 mg) by mouth 2 times daily for 3 days (for outbreaks)    Health Maintenance:    1. ASCUS with positive high risk HPV cervical  - Gynecologic Cytology (Pap) and HPV - Recommended Age 30-65 Years    3. Encounter for screening mammogram for breast cancer  - [place] Screen Bilateral w/Fernando; Future - [facility] in the future, someone  "should contact you, otherwise just call us in 2 months    4. Colon cancer screening  - COLOGUARD(EXACT SCIENCES); Future - you will receive in the mail    6. Other orders  -     TDAP 10-64Y (ADACEL,BOOSTRIX)    Follow up plan  Return in about 6 months (around 2025) for Routine preventive, with Dr. Cheema.    I spent a total of 45 minutes on the day of the visit.   Time spent by me doing chart review, history and exam, documentation and further activities per the note  The longitudinal plan of care for the diagnosis(es)/condition(s) as documented were addressed during this visit. Due to the added complexity in care, I will continue to support Mariana in the subsequent management and with ongoing continuity of care.          BMI  Estimated body mass index is 29.57 kg/m  as calculated from the following:    Height as of this encounter: 1.651 m (5' 5\").    Weight as of this encounter: 80.6 kg (177 lb 11.2 oz).             Return in about 6 months (around 2025) for Routine preventive, with Dr. Cheema.    Subjective   Mariana is a 43 year old, presenting for the following health issues:  Other (Pt would like to discuss perimenopause and hpv testing)        2024     4:22 PM   Additional Questions   Roomed by Stephen   Accompanied by self         2024    Information    services provided? No        HPI     Perimenpause?  LMP   Wants to document process of getting older    After unprotected sex had sensation of \"Synapses firing/clenching\" in uterus area. Then when had next period (2024). Subsequent periods normal color and sensation.   Felt similar to an induced  she had previously. Since then no symptoms and regular periods.         Objective    /66   Pulse 75   Temp 98.3  F (36.8  C) (Oral)   Resp 17   Ht 1.651 m (5' 5\")   Wt 80.6 kg (177 lb 11.2 oz)   LMP 2024 (Exact Date)   SpO2 96%   BMI 29.57 kg/m    Body mass index is 29.57 kg/m .  Physical " Exam  Genitourinary:     General: Normal vulva.      Exam position: Lithotomy position.      Vagina: Normal.      Cervix: Normal.                        Signed Electronically by: Katt Cheema MD

## 2024-07-23 NOTE — PATIENT INSTRUCTIONS
Patient Education   Here is the plan from today's visit    1. ASCUS with positive high risk HPV cervical  - Gynecologic Cytology (Pap) and HPV - Recommended Age 30-65 Years    2. Encounter for emergency contraception  - Ulipristal Acetate (TRACIE) 30 MG tablet; Take 1 tablet (30 mg) by mouth once for 1 dose  Dispense: 1 tablet; Refill: 11    3. Encounter for screening mammogram for breast cancer  - MA Screen Bilateral w/Fernando; Future - [facility] in the future, someone should contact you, otherwise just call us in 2 months    4. Colon cancer screening  - COLOGUARD(EXACT SCIENCES); Future - you will receive in the mail    5. Herpes simplex virus infection  -     valACYclovir (VALTREX) 500 MG tablet; Take 1 tablet (500 mg) by mouth daily  -     valACYclovir (VALTREX) 500 MG tablet; Take 1 tablet (500 mg) by mouth 2 times daily for 3 days    6. Other orders  -     TDAP 10-64Y (ADACEL,BOOSTRIX)         Please call or return to clinic if your symptoms don't go away.    Follow up plan  Return in about 6 months (around 1/23/2025) for Routine preventive, with Dr. Cheema.    Thank you for coming to [facility] today.  Lab Testing:  **If you had lab testing today and your results are reassuring or normal they will be mailed to you or sent through Jounce within 7 days.   **If the lab tests need quick action we will call you with the results.  **If you are having labs done on a different day, please call [phone] to schedule at [facility] or [phone] for other [facility] locations. Labs do not offer walk-in appointments.  The phone number we will call with results is # [phone] (home) . If this is not the best number please call our clinic and change the number.  Medication Refills:  If you need any refills please call your pharmacy and they will contact us.   If you need to  your refill at a new pharmacy, please contact the new pharmacy directly. The new pharmacy will  help you get your medications transferred faster.   Scheduling:  If you have any concerns about today's visit or wish to schedule another appointment please call our office during normal business hours [phone] (8-5:00 M-F). If you can no longer make a scheduled visit, please cancel via Fixber or call us to cancel.   If a referral was made to an [facility] specialty provider and you do not get a call from central scheduling, please refer to directions on your visit summary or call our office during normal business hours for assistance.   If a Mammogram was ordered for you at the Breast Center call [phone] to schedule or change your appointment.  If you had an XRay/CT/Ultrasound/MRI ordered the number is [phone] to schedule or change your radiology appointment.   [facility] has limited ultrasound appointments available on Wednesdays, if you would like your ultrasound at [facility], please call [phone] to schedule.   Medical Concerns:  If you have urgent medical concerns please call [phone] at any time of the day.    Katt Cheema MD

## 2024-07-24 ENCOUNTER — ORDERS ONLY (AUTO-RELEASED) (OUTPATIENT)
Dept: FAMILY MEDICINE | Facility: CLINIC | Age: 44
End: 2024-07-24
Payer: COMMERCIAL

## 2024-07-24 DIAGNOSIS — Z12.11 COLON CANCER SCREENING: ICD-10-CM

## 2024-07-24 DIAGNOSIS — F33.1 MODERATE EPISODE OF RECURRENT MAJOR DEPRESSIVE DISORDER (H): ICD-10-CM

## 2024-07-24 RX ORDER — VENLAFAXINE HYDROCHLORIDE 37.5 MG/1
37.5 CAPSULE, EXTENDED RELEASE ORAL DAILY
Qty: 30 CAPSULE | Refills: 0 | Status: SHIPPED | OUTPATIENT
Start: 2024-07-24 | End: 2024-08-26

## 2024-07-24 NOTE — TELEPHONE ENCOUNTER
"Date of Last Office Visit: 5/31/2024  [facility]  RTC: 12 wks  No shows: 0  Cancellations: 0  Date of Next Office Visit:   8/23/2024 4:30 PM (30 min)  Jeevan    Arrive by:  4:15 PM   ADULT PSYCHIATRY RETURN    URPSY ([facility])   Adamaris Hester APRN CNP     ------------------------------    Incoming refill from Pharmacy via interface  Medication requested:    venlafaxine (EFFEXOR XR) 37.5 MG 24 hr capsule 30 capsule 1 5/31/2024 -- No   Sig - Route: Take 1 capsule (37.5 mg) by mouth daily      ------------------------------  From last visit note:   \"continue venlafaxine XR 37.5mg QAM \"       Refill decision: Medication refilled 30d per protocol.                   "

## 2024-07-25 LAB
HPV HR 12 DNA CVX QL NAA+PROBE: NEGATIVE
HPV16 DNA CVX QL NAA+PROBE: NEGATIVE
HPV18 DNA CVX QL NAA+PROBE: NEGATIVE
HUMAN PAPILLOMA VIRUS FINAL DIAGNOSIS: NORMAL

## 2024-07-29 LAB
BKR LAB AP GYN ADEQUACY: NORMAL
BKR LAB AP GYN INTERPRETATION: NORMAL
BKR LAB AP LMP: NORMAL
BKR LAB AP PREVIOUS ABNL DX: NORMAL
BKR LAB AP PREVIOUS ABNORMAL: NORMAL
PATH REPORT.COMMENTS IMP SPEC: NORMAL
PATH REPORT.COMMENTS IMP SPEC: NORMAL
PATH REPORT.RELEVANT HX SPEC: NORMAL

## 2024-08-26 ENCOUNTER — VIRTUAL VISIT (OUTPATIENT)
Dept: PSYCHIATRY | Facility: CLINIC | Age: 44
End: 2024-08-26
Attending: NURSE PRACTITIONER
Payer: COMMERCIAL

## 2024-08-26 DIAGNOSIS — F33.1 MODERATE EPISODE OF RECURRENT MAJOR DEPRESSIVE DISORDER (H): ICD-10-CM

## 2024-08-26 PROCEDURE — 99214 OFFICE O/P EST MOD 30 MIN: CPT | Mod: 95 | Performed by: NURSE PRACTITIONER

## 2024-08-26 RX ORDER — VENLAFAXINE HYDROCHLORIDE 37.5 MG/1
37.5 CAPSULE, EXTENDED RELEASE ORAL DAILY
Qty: 30 CAPSULE | Refills: 2 | Status: SHIPPED | OUTPATIENT
Start: 2024-08-26

## 2024-08-26 ASSESSMENT — PATIENT HEALTH QUESTIONNAIRE - PHQ9
SUM OF ALL RESPONSES TO PHQ QUESTIONS 1-9: 17
10. IF YOU CHECKED OFF ANY PROBLEMS, HOW DIFFICULT HAVE THESE PROBLEMS MADE IT FOR YOU TO DO YOUR WORK, TAKE CARE OF THINGS AT HOME, OR GET ALONG WITH OTHER PEOPLE: SOMEWHAT DIFFICULT
SUM OF ALL RESPONSES TO PHQ QUESTIONS 1-9: 17

## 2024-08-26 NOTE — NURSING NOTE
Current patient location:  [place]     Is the patient currently in the state [facility]? YES    Visit mode:TELEPHONE    If the visit is dropped, the patient can be reconnected by: TELEPHONE VISIT: Phone number: [phone]    Will anyone else be joining the visit? NO  (If patient encounters technical issues they should call [phone])    How would you like to obtain your AVS? MyChart    Are changes needed to the allergy or medication list? Pt stated no changes to allergies and Pt stated no med changes    Are refills needed on medications prescribed by this physician? NO    Rooming Documentation:  Questionnaire(s) completed      Reason for visit: RECHLUCIEN Rojas F
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
## 2024-08-27 RX ORDER — VENLAFAXINE HYDROCHLORIDE 37.5 MG/1
37.5 CAPSULE, EXTENDED RELEASE ORAL DAILY
Qty: 30 CAPSULE | Refills: 0 | OUTPATIENT
Start: 2024-08-27

## 2024-10-01 NOTE — TELEPHONE ENCOUNTER
FUTURE VISIT INFORMATION      FUTURE VISIT INFORMATION:  Date: 12/9/2024  Time: 4:30 PM  Location: [facility]  REFERRAL INFORMATION:  Referring provider: Self-Referred  Referring providers clinic:  N/A  Reason for visit/diagnosis: Deviated Septum    RECORDS REQUESTED FROM:       Clinic name Comments Records Status Imaging Status   [facility] 10/10/22 - ENT OV with Dr. Jyoti Baez

## 2024-11-18 ENCOUNTER — VIRTUAL VISIT (OUTPATIENT)
Dept: PSYCHIATRY | Facility: CLINIC | Age: 44
End: 2024-11-18
Attending: NURSE PRACTITIONER
Payer: COMMERCIAL

## 2024-11-18 DIAGNOSIS — F33.1 MODERATE EPISODE OF RECURRENT MAJOR DEPRESSIVE DISORDER (H): ICD-10-CM

## 2024-11-18 RX ORDER — VENLAFAXINE HYDROCHLORIDE 37.5 MG/1
37.5 CAPSULE, EXTENDED RELEASE ORAL DAILY
Qty: 30 CAPSULE | Refills: 2 | Status: SHIPPED | OUTPATIENT
Start: 2024-11-18

## 2024-11-18 ASSESSMENT — PAIN SCALES - GENERAL: PAINLEVEL_OUTOF10: MILD PAIN (2)

## 2024-11-18 ASSESSMENT — PATIENT HEALTH QUESTIONNAIRE - PHQ9
SUM OF ALL RESPONSES TO PHQ QUESTIONS 1-9: 11
10. IF YOU CHECKED OFF ANY PROBLEMS, HOW DIFFICULT HAVE THESE PROBLEMS MADE IT FOR YOU TO DO YOUR WORK, TAKE CARE OF THINGS AT HOME, OR GET ALONG WITH OTHER PEOPLE: SOMEWHAT DIFFICULT
SUM OF ALL RESPONSES TO PHQ QUESTIONS 1-9: 11
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
## 2024-11-18 NOTE — NURSING NOTE
Current patient location: Sitting in car    Is the patient currently in the state of [place]? YES    Visit mode:VIDEO    If the visit is dropped, the patient can be reconnected by:VIDEO VISIT: Text to cell phone:   Telephone Information:   Mobile [phone]       Will anyone else be joining the visit? NO  (If patient encounters technical issues they should call [phone])    Are changes needed to the allergy or medication list? No    Are refills needed on medications prescribed by this physician? YES    Rooming Documentation:  Questionnaire(s) completed    Reason for visit: RECHECK    Wendy GAINES

## 2024-11-18 NOTE — PATIENT INSTRUCTIONS
**For crisis resources, please see the information at the end of this document**   Patient Education    Thank you for coming to the [facility].     Lab Testing:  If you had lab testing today and your results are reassuring or normal they will be mailed to you or sent through [employer] within 7 days. If the lab tests need quick action we will call you with the results. The phone number we will call with results is # [phone]. If this is not the best number please call our clinic and change the number.     Medication Refills:  If you need any refills please call your pharmacy and they will contact us. Our fax number for refills is [phone].   Three business days of notice are needed for general medication refill requests.   Five business days of notice are needed for controlled substance refill requests.   If you need to change to a different pharmacy, please contact the new pharmacy directly. The new pharmacy will help you get your medications transferred.     Contact Us:  Please call [phone] during business hours (8-5:00 M-F).   If you have medication related questions after clinic hours, or on the weekend, please call [phone].     Financial Assistance [phone]   Medical Records [phone]       MENTAL HEALTH CRISIS RESOURCES:  For a emergency help, please call 911 or go to the nearest Emergency Department.     Emergency Walk-In Options:   Em[facility] Unit @ [facility] ([place]): [phone] - Specialized mental health emergency area designed to be calming  [facility] ([place]): [phone]  [facility] ([place]): [phone]  [facility]): [phone]    [facility] Crisis Information:   [place]: [phone]  [place]: [phone]  [place] ([place]) - Adult: [phone]     Child: [phone]  [place] - Adult: [phone]     Child: [phone]  [place]:  [phone]  List of all [facility] resources:   https://[place].gov/dhs/people-we-serve/adults/health-care/mental-health/resources/crisis-contacts.jsp    National Crisis Information:   Crisis Text Line: Text  [place]  to 439357  Suicide & Crisis Lifeline: 988  National Suicide Prevention Lifeline: 7-901-711-TALK (1-906.433.3033)       For online chat options, visit https://suicidepreventionlifeline.org/chat/  Poison Control Center: 3-606-918-0112  Trans Lifeline: 1-123-471-4091 - Hotline for transgender people of all ages  [employer]: 9-704-537-5452 - Hotline for LGBT youth     For Non-Emergency Support:   Fast Tracker: Mental Health & Substance Use Disorder Resources -   https://www.[employer]n.org/

## 2024-12-09 ENCOUNTER — PRE VISIT (OUTPATIENT)
Dept: OTOLARYNGOLOGY | Facility: CLINIC | Age: 44
End: 2024-12-09

## 2024-12-16 NOTE — TELEPHONE ENCOUNTER
FUTURE VISIT INFORMATION        FUTURE VISIT INFORMATION:  Date: 3/10/25  Time: 4:30 PM  Location: [facility]  REFERRAL INFORMATION:  Referring provider: Self-Referred  Referring providers clinic:  N/A  Reason for visit/diagnosis: Deviated Septum     RECORDS REQUESTED FROM:         Clinic name Comments Records Status Imaging Status   [facility] OV with Dr. Jyoti Baez

## 2025-01-07 ENCOUNTER — TELEPHONE (OUTPATIENT)
Dept: PSYCHIATRY | Facility: CLINIC | Age: 45
End: 2025-01-07
Payer: COMMERCIAL

## 2025-01-07 NOTE — TELEPHONE ENCOUNTER
[facility] Call Center    Phone Message    May a detailed message be left on voicemail: yes     Reason for Call: Other: Pt stated she lost her ins and if she doesn't get back, she's wondering how to taper off of her medication as responsibly as she can be.      Action Taken: Other: [place] Recorded Future    Travel Screening: Not Applicable     Date of Service:

## 2025-01-07 NOTE — TELEPHONE ENCOUNTER
Per 11/18/24 visit with Adamaris Hester:  she chooses to continue venlafaxine XR 37.5mg QA       GoodRx shows the 37.5 mg dose of venlafaxine XR is available for around $13-14 at several pharmacies.   [facility] pharmacy offers a 30 day supply for $7.  Algorithmicst message sent to patient, which includes this information (see separate encounter).  Adamaris Hester updated.

## 2025-01-31 NOTE — TELEPHONE ENCOUNTER
Orders:    amLODIPine (NORVASC) 10 mg tablet; Take 1 tablet (10 mg total) by mouth daily    rosuvastatin (CRESTOR) 5 mg tablet; Take 1 tablet (5 mg total) by mouth daily    losartan (COZAAR) 100 MG tablet; Take 1 tablet (100 mg total) by mouth in the morning     ENT referral placed. Please inform patient  -Katt Cheema MD

## 2025-03-10 ENCOUNTER — PRE VISIT (OUTPATIENT)
Dept: OTOLARYNGOLOGY | Facility: CLINIC | Age: 45
End: 2025-03-10

## 2025-06-25 ENCOUNTER — TELEPHONE (OUTPATIENT)
Dept: FAMILY MEDICINE | Facility: CLINIC | Age: 45
End: 2025-06-25
Payer: COMMERCIAL

## 2025-06-25 NOTE — TELEPHONE ENCOUNTER
Patient Quality Outreach    Patient is due for the following:   Colon Cancer Screening  Depression  -  PHQ-9 needed    Action(s) Taken:   No follow up needed at this time.    Type of outreach:    Sent [employer] message.    Questions for provider review:    None         Lalita Vizcarra MA  Chart routed to None.

## 2025-07-12 ENCOUNTER — HEALTH MAINTENANCE LETTER (OUTPATIENT)
Age: 45
End: 2025-07-12